# Patient Record
Sex: MALE | Race: WHITE | NOT HISPANIC OR LATINO
[De-identification: names, ages, dates, MRNs, and addresses within clinical notes are randomized per-mention and may not be internally consistent; named-entity substitution may affect disease eponyms.]

---

## 2017-07-25 VITALS
WEIGHT: 174.83 LBS | HEIGHT: 68.5 IN | SYSTOLIC BLOOD PRESSURE: 117 MMHG | OXYGEN SATURATION: 100 % | TEMPERATURE: 208 F | DIASTOLIC BLOOD PRESSURE: 78 MMHG | RESPIRATION RATE: 18 BRPM | HEART RATE: 79 BPM

## 2017-07-25 NOTE — PATIENT PROFILE ADULT. - PSH
History of surgery  R shoulder History of colonoscopy    History of endoscopy    History of surgery  R shoulder 2002  History of torn meniscus of left knee

## 2017-07-26 ENCOUNTER — TRANSCRIPTION ENCOUNTER (OUTPATIENT)
Age: 67
End: 2017-07-26

## 2017-07-26 ENCOUNTER — INPATIENT (INPATIENT)
Facility: HOSPITAL | Age: 67
LOS: 0 days | Discharge: HOME | DRG: 483 | End: 2017-07-27
Attending: ORTHOPAEDIC SURGERY | Admitting: ORTHOPAEDIC SURGERY
Payer: COMMERCIAL

## 2017-07-26 DIAGNOSIS — Z87.828 PERSONAL HISTORY OF OTHER (HEALED) PHYSICAL INJURY AND TRAUMA: Chronic | ICD-10-CM

## 2017-07-26 DIAGNOSIS — Z98.890 OTHER SPECIFIED POSTPROCEDURAL STATES: Chronic | ICD-10-CM

## 2017-07-26 DIAGNOSIS — M19.012 PRIMARY OSTEOARTHRITIS, LEFT SHOULDER: ICD-10-CM

## 2017-07-26 LAB
BASOPHILS NFR BLD AUTO: 0.2 % — SIGNIFICANT CHANGE UP (ref 0–2)
EOSINOPHIL NFR BLD AUTO: 0.9 % — SIGNIFICANT CHANGE UP (ref 0–6)
HCT VFR BLD CALC: 26.3 % — LOW (ref 39–50)
HGB BLD-MCNC: 8.4 G/DL — LOW (ref 13–17)
LYMPHOCYTES # BLD AUTO: 8.2 % — LOW (ref 13–44)
MCHC RBC-ENTMCNC: 29.5 PG — SIGNIFICANT CHANGE UP (ref 27–34)
MCHC RBC-ENTMCNC: 31.9 G/DL — LOW (ref 32–36)
MCV RBC AUTO: 92.3 FL — SIGNIFICANT CHANGE UP (ref 80–100)
MONOCYTES NFR BLD AUTO: 7.5 % — SIGNIFICANT CHANGE UP (ref 2–14)
NEUTROPHILS NFR BLD AUTO: 83.2 % — HIGH (ref 43–77)
PLATELET # BLD AUTO: 132 K/UL — LOW (ref 150–400)
RBC # BLD: 2.85 M/UL — LOW (ref 4.2–5.8)
RBC # FLD: 12.1 % — SIGNIFICANT CHANGE UP (ref 10.3–16.9)
WBC # BLD: 9.2 K/UL — SIGNIFICANT CHANGE UP (ref 3.8–10.5)
WBC # FLD AUTO: 9.2 K/UL — SIGNIFICANT CHANGE UP (ref 3.8–10.5)

## 2017-07-26 PROCEDURE — 73020 X-RAY EXAM OF SHOULDER: CPT | Mod: 26,LT

## 2017-07-26 PROCEDURE — 99222 1ST HOSP IP/OBS MODERATE 55: CPT | Mod: GC

## 2017-07-26 RX ORDER — SODIUM CHLORIDE 9 MG/ML
1000 INJECTION, SOLUTION INTRAVENOUS
Qty: 0 | Refills: 0 | Status: DISCONTINUED | OUTPATIENT
Start: 2017-07-26 | End: 2017-07-27

## 2017-07-26 RX ORDER — HYDROCHLOROTHIAZIDE 25 MG
12.5 TABLET ORAL DAILY
Qty: 0 | Refills: 0 | Status: DISCONTINUED | OUTPATIENT
Start: 2017-07-26 | End: 2017-07-27

## 2017-07-26 RX ORDER — SODIUM CHLORIDE 9 MG/ML
1000 INJECTION INTRAMUSCULAR; INTRAVENOUS; SUBCUTANEOUS ONCE
Qty: 0 | Refills: 0 | Status: COMPLETED | OUTPATIENT
Start: 2017-07-26 | End: 2017-07-26

## 2017-07-26 RX ORDER — OXYCODONE HYDROCHLORIDE 5 MG/1
10 TABLET ORAL EVERY 4 HOURS
Qty: 0 | Refills: 0 | Status: DISCONTINUED | OUTPATIENT
Start: 2017-07-27 | End: 2017-07-27

## 2017-07-26 RX ORDER — VALSARTAN 80 MG/1
160 TABLET ORAL DAILY
Qty: 0 | Refills: 0 | Status: DISCONTINUED | OUTPATIENT
Start: 2017-07-26 | End: 2017-07-27

## 2017-07-26 RX ORDER — FINASTERIDE 5 MG/1
5 TABLET, FILM COATED ORAL DAILY
Qty: 0 | Refills: 0 | Status: DISCONTINUED | OUTPATIENT
Start: 2017-07-26 | End: 2017-07-27

## 2017-07-26 RX ORDER — HYDROMORPHONE HYDROCHLORIDE 2 MG/ML
0.5 INJECTION INTRAMUSCULAR; INTRAVENOUS; SUBCUTANEOUS
Qty: 0 | Refills: 0 | Status: DISCONTINUED | OUTPATIENT
Start: 2017-07-26 | End: 2017-07-27

## 2017-07-26 RX ORDER — ONDANSETRON 8 MG/1
4 TABLET, FILM COATED ORAL EVERY 6 HOURS
Qty: 0 | Refills: 0 | Status: DISCONTINUED | OUTPATIENT
Start: 2017-07-26 | End: 2017-07-27

## 2017-07-26 RX ORDER — MORPHINE SULFATE 50 MG/1
4 CAPSULE, EXTENDED RELEASE ORAL
Qty: 0 | Refills: 0 | Status: DISCONTINUED | OUTPATIENT
Start: 2017-07-26 | End: 2017-07-27

## 2017-07-26 RX ORDER — CEFAZOLIN SODIUM 1 G
2000 VIAL (EA) INJECTION EVERY 8 HOURS
Qty: 0 | Refills: 0 | Status: COMPLETED | OUTPATIENT
Start: 2017-07-26 | End: 2017-07-27

## 2017-07-26 RX ADMIN — Medication 100 MILLIGRAM(S): at 22:13

## 2017-07-26 RX ADMIN — SODIUM CHLORIDE 1000 MILLILITER(S): 9 INJECTION INTRAMUSCULAR; INTRAVENOUS; SUBCUTANEOUS at 22:14

## 2017-07-26 NOTE — PROGRESS NOTE ADULT - SUBJECTIVE AND OBJECTIVE BOX
Ortho Post Op Check    Procedure: L TSA Bicep Tenodesis  Surgeon: Álvaro  Laterality: Left    Pt comfortable without complaints, pain controlled  Denies CP, SOB, N/V, numbness/tingling     Vital Signs Last 24 Hrs  T(C): 36 (07-26-17 @ 17:00), Max: 36 (07-26-17 @ 17:00)  T(F): 96.8 (07-26-17 @ 17:00), Max: 96.8 (07-26-17 @ 17:00)  HR: 74 (07-26-17 @ 17:30) (69 - 76)  BP: 97/57 (07-26-17 @ 17:30) (94/58 - 118/59)  BP(mean): --  RR: 16 (07-26-17 @ 17:30) (15 - 16)  SpO2: 98% (07-26-17 @ 17:30) (97% - 98%)  AVSS    General: Pt Alert and oriented, NAD  DSG C/D/I  Pulses:  Sensation: Can feel prerssure   Motor: 0/5 LUE Globally. (Patient has a scalene block, expected to wean off by tomorrow).                A/P: 66yMale POD#0 s/p L TSA Bicep tenodesis  - Stable  - Pain Control  - DVT ppx: ASA   - Post op abx: Ancef  - PT, WBS: NWB LUE    Ortho Pager [phone]

## 2017-07-26 NOTE — DISCHARGE NOTE ADULT - CARE PLAN
Principal Discharge DX:	Left shoulder pain  Goal:	improvement after surgery  Instructions for follow-up, activity and diet:	No strenuous activity, heavy lifting, driving, tub bathing, or returning to work until cleared by MD.  You may shower--dressing is waterproof.  Remove dressing after post op day 5, then leave incision open to air.  Follow up with Dr. Rea, call: office to schedule an appt within 10-14 days.  If you don't have a bowel movement by post op day 3, then take Milk of Magnesia (over the counter).  If no bowel movement by at least post op day 5, then use a Dulcolax suppository (over the counter) and/or a Fleets enema--if still no bowel movement, call your MD.  Contact your doctor if you experience: fever greater than 101.5, chills, chest pain, difficulty breathing, bleeding, redness or heat around the incision. Principal Discharge DX:	Left shoulder pain  Goal:	improvement after surgery  Instructions for follow-up, activity and diet:	Non-weight bearing on left arm.  Keep sling on at all times except when showering and dressing.  No strenuous activity, heavy lifting, driving, tub bathing, or returning to work until cleared by MD.  You may shower--dressing is waterproof.  Remove dressing after post op day 7, then leave incision open to air.  Follow up with Dr. Rea, call: office to schedule an appt within 10-14 days.  If you don't have a bowel movement by post op day 3, then take Milk of Magnesia (over the counter).  If no bowel movement by at least post op day 5, then use a Dulcolax suppository (over the counter) and/or a Fleets enema--if still no bowel movement, call your MD.  Contact your doctor if you experience: fever greater than 101.5, chills, chest pain, difficulty breathing, bleeding, redness or heat around the incision.

## 2017-07-26 NOTE — H&P ADULT - PSH
History of colonoscopy    History of endoscopy    History of surgery  R shoulder 2002  History of torn meniscus of left knee

## 2017-07-26 NOTE — DISCHARGE NOTE ADULT - MEDICATION SUMMARY - MEDICATIONS TO TAKE
I will START or STAY ON the medications listed below when I get home from the hospital:    Avodart 0.5 mg oral capsule  -- 1 cap(s) by mouth once a day  -- Indication: For Home medicine    meloxicam 15 mg oral tablet  -- 1 tab(s) by mouth once a day  -- Do not take this drug if you are pregnant.  Obtain medical advice before taking any non-prescription drugs as some may affect the action of this medication.  Take with food or milk.    -- Indication: For antiinflammatory/pain control    acetaminophen-oxycodone 325 mg-5 mg oral tablet  -- 1 to 2 tab(s) by mouth every 4 to 6 hours, as needed, pain MDD:6  -- Caution federal law prohibits the transfer of this drug to any person other  than the person for whom it was prescribed.  May cause drowsiness.  Alcohol may intensify this effect.  Use care when operating dangerous machinery.  This prescription cannot be refilled.  This product contains acetaminophen.  Do not use  with any other product containing acetaminophen to prevent possible liver damage.  Using more of this medication than prescribed may cause serious breathing problems.    -- Indication: For moderate to severe pain    aspirin 325 mg oral delayed release tablet  -- 1 tab(s) by mouth 2 times a day  Take for 4 weeks after surgery to prevent blood clots.  OVER THE COUNTER MEDICINE  -- Indication: For Prevent blood clots    Diovan  mg-12.5 mg oral tablet  --  by mouth 2 times a day  -- Indication: For Hypertension    bisacodyl 10 mg rectal suppository  -- 1 suppository(ies) rectally once a day, As needed, If no bowel movement by postoperative day #2  -- Indication: For constipation    docusate sodium 100 mg oral capsule  -- 1 cap(s) by mouth 3 times a day  -- Indication: For constipation    polyethylene glycol 3350 oral powder for reconstitution  -- 17 gram(s) by mouth once a day  -- Indication: For constipation    senna oral tablet  -- 2 tab(s) by mouth once a day (at bedtime), As needed, Constipation  -- Indication: For constipation

## 2017-07-26 NOTE — H&P ADULT - HISTORY OF PRESENT ILLNESS
66M RHD c/o Left shoulder pain worsened over time without improvement. Denies numbness, tingling. Ambulates without assist. Presents today for elective Left TSR.

## 2017-07-26 NOTE — DISCHARGE NOTE ADULT - PLAN OF CARE
improvement after surgery No strenuous activity, heavy lifting, driving, tub bathing, or returning to work until cleared by MD.  You may shower--dressing is waterproof.  Remove dressing after post op day 5, then leave incision open to air.  Follow up with Dr. Rea, call: office to schedule an appt within 10-14 days.  If you don't have a bowel movement by post op day 3, then take Milk of Magnesia (over the counter).  If no bowel movement by at least post op day 5, then use a Dulcolax suppository (over the counter) and/or a Fleets enema--if still no bowel movement, call your MD.  Contact your doctor if you experience: fever greater than 101.5, chills, chest pain, difficulty breathing, bleeding, redness or heat around the incision. Non-weight bearing on left arm.  Keep sling on at all times except when showering and dressing.  No strenuous activity, heavy lifting, driving, tub bathing, or returning to work until cleared by MD.  You may shower--dressing is waterproof.  Remove dressing after post op day 7, then leave incision open to air.  Follow up with Dr. Rea, call: office to schedule an appt within 10-14 days.  If you don't have a bowel movement by post op day 3, then take Milk of Magnesia (over the counter).  If no bowel movement by at least post op day 5, then use a Dulcolax suppository (over the counter) and/or a Fleets enema--if still no bowel movement, call your MD.  Contact your doctor if you experience: fever greater than 101.5, chills, chest pain, difficulty breathing, bleeding, redness or heat around the incision.

## 2017-07-26 NOTE — DISCHARGE NOTE ADULT - PATIENT PORTAL LINK FT
“You can access the [employer] Patient Portal, offered by [facility], by registering with the following website: [email or web address]”

## 2017-07-26 NOTE — DISCHARGE NOTE ADULT - CARE PROVIDER_API CALL
Boyd Rea), Orthopaedic Surgery  [address] [place] [address]  Phone: [phone]  Fax: [phone]

## 2017-07-26 NOTE — DISCHARGE NOTE ADULT - NS AS ACTIVITY OBS
No Heavy lifting/straining Do not drive or operate machinery/Showering allowed/No Heavy lifting/straining/Do not make important decisions

## 2017-07-26 NOTE — DISCHARGE NOTE ADULT - HOSPITAL COURSE
Admit  OR  Periop Antibx  DVT ppx  PT   Pain mgt Admit 7/26/17  OR 7/26/17- L TSA  Periop Antibx  DVT ppx  PT   Pain mgt

## 2017-07-26 NOTE — H&P ADULT - NSHPPHYSICALEXAM_GEN_ALL_CORE
Left upper ext: decreased ROM secondary to pain, sensation intact, pulses intact,  5/5  Rest of PE per MD clearance

## 2017-07-27 VITALS
RESPIRATION RATE: 18 BRPM | SYSTOLIC BLOOD PRESSURE: 127 MMHG | DIASTOLIC BLOOD PRESSURE: 80 MMHG | OXYGEN SATURATION: 100 % | HEART RATE: 107 BPM | TEMPERATURE: 97 F

## 2017-07-27 LAB
ANION GAP SERPL CALC-SCNC: 12 MMOL/L — SIGNIFICANT CHANGE UP (ref 5–17)
BUN SERPL-MCNC: 27 MG/DL — HIGH (ref 7–23)
CALCIUM SERPL-MCNC: 9.1 MG/DL — SIGNIFICANT CHANGE UP (ref 8.4–10.5)
CHLORIDE SERPL-SCNC: 106 MMOL/L — SIGNIFICANT CHANGE UP (ref 96–108)
CO2 SERPL-SCNC: 22 MMOL/L — SIGNIFICANT CHANGE UP (ref 22–31)
CREAT SERPL-MCNC: 1.2 MG/DL — SIGNIFICANT CHANGE UP (ref 0.5–1.3)
GLUCOSE SERPL-MCNC: 135 MG/DL — HIGH (ref 70–99)
HCT VFR BLD CALC: 31.2 % — LOW (ref 39–50)
HGB BLD-MCNC: 10.7 G/DL — LOW (ref 13–17)
MCHC RBC-ENTMCNC: 30.1 PG — SIGNIFICANT CHANGE UP (ref 27–34)
MCHC RBC-ENTMCNC: 34.3 G/DL — SIGNIFICANT CHANGE UP (ref 32–36)
MCV RBC AUTO: 87.6 FL — SIGNIFICANT CHANGE UP (ref 80–100)
MRSA PCR RESULT.: NEGATIVE — SIGNIFICANT CHANGE UP
PLATELET # BLD AUTO: 172 K/UL — SIGNIFICANT CHANGE UP (ref 150–400)
POTASSIUM SERPL-MCNC: 4.3 MMOL/L — SIGNIFICANT CHANGE UP (ref 3.5–5.3)
POTASSIUM SERPL-SCNC: 4.3 MMOL/L — SIGNIFICANT CHANGE UP (ref 3.5–5.3)
RBC # BLD: 3.56 M/UL — LOW (ref 4.2–5.8)
RBC # FLD: 12.9 % — SIGNIFICANT CHANGE UP (ref 10.3–16.9)
S AUREUS DNA NOSE QL NAA+PROBE: NEGATIVE — SIGNIFICANT CHANGE UP
SODIUM SERPL-SCNC: 140 MMOL/L — SIGNIFICANT CHANGE UP (ref 135–145)
WBC # BLD: 12.6 K/UL — HIGH (ref 3.8–10.5)
WBC # FLD AUTO: 12.6 K/UL — HIGH (ref 3.8–10.5)

## 2017-07-27 RX ORDER — DOCUSATE SODIUM 100 MG
1 CAPSULE ORAL
Qty: 0 | Refills: 0 | DISCHARGE
Start: 2017-07-27

## 2017-07-27 RX ORDER — DOCUSATE SODIUM 100 MG
100 CAPSULE ORAL THREE TIMES A DAY
Qty: 0 | Refills: 0 | Status: DISCONTINUED | OUTPATIENT
Start: 2017-07-27 | End: 2017-07-27

## 2017-07-27 RX ORDER — HYDROMORPHONE HYDROCHLORIDE 2 MG/ML
0.5 INJECTION INTRAMUSCULAR; INTRAVENOUS; SUBCUTANEOUS
Qty: 0 | Refills: 0 | Status: DISCONTINUED | OUTPATIENT
Start: 2017-07-27 | End: 2017-07-27

## 2017-07-27 RX ORDER — SENNA PLUS 8.6 MG/1
2 TABLET ORAL AT BEDTIME
Qty: 0 | Refills: 0 | Status: DISCONTINUED | OUTPATIENT
Start: 2017-07-27 | End: 2017-07-27

## 2017-07-27 RX ORDER — ASPIRIN/CALCIUM CARB/MAGNESIUM 324 MG
325 TABLET ORAL
Qty: 0 | Refills: 0 | Status: DISCONTINUED | OUTPATIENT
Start: 2017-07-27 | End: 2017-07-27

## 2017-07-27 RX ORDER — MAGNESIUM HYDROXIDE 400 MG/1
30 TABLET, CHEWABLE ORAL DAILY
Qty: 0 | Refills: 0 | Status: DISCONTINUED | OUTPATIENT
Start: 2017-07-27 | End: 2017-07-27

## 2017-07-27 RX ORDER — OXYCODONE HYDROCHLORIDE 5 MG/1
5 TABLET ORAL EVERY 4 HOURS
Qty: 0 | Refills: 0 | Status: DISCONTINUED | OUTPATIENT
Start: 2017-07-27 | End: 2017-07-27

## 2017-07-27 RX ORDER — SENNA PLUS 8.6 MG/1
2 TABLET ORAL
Qty: 0 | Refills: 0 | DISCHARGE
Start: 2017-07-27

## 2017-07-27 RX ORDER — OXYCODONE HYDROCHLORIDE 5 MG/1
1 TABLET ORAL
Qty: 42 | Refills: 0
Start: 2017-07-27

## 2017-07-27 RX ORDER — KETOROLAC TROMETHAMINE 30 MG/ML
15 SYRINGE (ML) INJECTION EVERY 6 HOURS
Qty: 0 | Refills: 0 | Status: DISCONTINUED | OUTPATIENT
Start: 2017-07-27 | End: 2017-07-27

## 2017-07-27 RX ORDER — POLYETHYLENE GLYCOL 3350 17 G/17G
17 POWDER, FOR SOLUTION ORAL DAILY
Qty: 0 | Refills: 0 | Status: DISCONTINUED | OUTPATIENT
Start: 2017-07-27 | End: 2017-07-27

## 2017-07-27 RX ORDER — ACETAMINOPHEN 500 MG
650 TABLET ORAL EVERY 6 HOURS
Qty: 0 | Refills: 0 | Status: DISCONTINUED | OUTPATIENT
Start: 2017-07-27 | End: 2017-07-27

## 2017-07-27 RX ORDER — MELOXICAM 15 MG/1
1 TABLET ORAL
Qty: 30 | Refills: 0
Start: 2017-07-27 | End: 2017-08-26

## 2017-07-27 RX ORDER — ASPIRIN/CALCIUM CARB/MAGNESIUM 324 MG
1 TABLET ORAL
Qty: 0 | Refills: 0 | DISCHARGE
Start: 2017-07-27

## 2017-07-27 RX ORDER — POLYETHYLENE GLYCOL 3350 17 G/17G
17 POWDER, FOR SOLUTION ORAL
Qty: 0 | Refills: 0 | DISCHARGE
Start: 2017-07-27

## 2017-07-27 RX ADMIN — OXYCODONE HYDROCHLORIDE 10 MILLIGRAM(S): 5 TABLET ORAL at 12:20

## 2017-07-27 RX ADMIN — Medication 100 MILLIGRAM(S): at 05:31

## 2017-07-27 NOTE — PROGRESS NOTE ADULT - ASSESSMENT
A/P:  66y Male s/p L TSA on 7/26/17   - NWB LUE  - DVT ppx  - PT  - Analgesia  - Dispo  - Antibiotics: perioperative x24h

## 2017-07-27 NOTE — PROGRESS NOTE ADULT - SUBJECTIVE AND OBJECTIVE BOX
Orthopaedic Surgery Progress Note    S: Pain well-controlled. Denies CP/SOB/N/V    O:  Vital Signs Last 24 Hrs  T(C): 36.1 (27 Jul 2017 05:27), Max: 36.6 (26 Jul 2017 18:15)  T(F): 96.9 (27 Jul 2017 05:27), Max: 97.9 (26 Jul 2017 18:15)  HR: 78 (27 Jul 2017 05:27) (67 - 78)  BP: 102/59 (27 Jul 2017 05:27) (85/51 - 136/77)  BP(mean): --  RR: 15 (27 Jul 2017 05:27) (14 - 16)  SpO2: 96% (27 Jul 2017 05:27) (96% - 100%)    I&O's Summary    26 Jul 2017 07:01  -  27 Jul 2017 07:00  --------------------------------------------------------  IN: 2787.5 mL / OUT: 400 mL / NET: 2387.5 mL        MEDICATIONS  (STANDING):  lactated ringers. 1000 milliLiter(s) (125 mL/Hr) IV Continuous <Continuous>  finasteride 5 milliGRAM(s) Oral daily  valsartan 160 milliGRAM(s) Oral daily  hydrochlorothiazide 12.5 milliGRAM(s) Oral daily    MEDICATIONS  (PRN):  morphine  - Injectable 4 milliGRAM(s) IV Push every 3 hours PRN Severe Pain  ondansetron Injectable 4 milliGRAM(s) IV Push every 6 hours PRN Nausea and/or Vomiting  HYDROmorphone  Injectable 0.5 milliGRAM(s) IV Push every 10 minutes PRN breakthrough pain                            8.4    9.2   )-----------( 132      ( 26 Jul 2017 17:37 )             26.3                   EXAM:  Gen: NAD, Alert    LUE: Dressing C/D/I, Fires FPL/EIP/GARTH, SILT m/u/r, fingers wwp

## 2017-07-27 NOTE — OCCUPATIONAL THERAPY INITIAL EVALUATION ADULT - RANGE OF MOTION EXAMINATION, UPPER EXTREMITY
Right UE Active ROM was WNL (within normal limits)/Right UE Passive ROM was WNL (within normal limits)/left upper extremity NT except digits/wrist AROM WFL

## 2017-07-27 NOTE — OCCUPATIONAL THERAPY INITIAL EVALUATION ADULT - PERTINENT HX OF CURRENT PROBLEM, REHAB EVAL
66M RHD c/o Left shoulder pain worsened over time without improvement. Denies numbness, tingling. Ambulates without assist. Presents today for elective Left TSR. L TSA on 7/26/17.

## 2017-07-27 NOTE — OCCUPATIONAL THERAPY INITIAL EVALUATION ADULT - MANUAL MUSCLE TESTING RESULTS, REHAB EVAL
left upper extremity NWB; right upper extremity >3/5 all joints, left hand  4/5/grossly assessed due to

## 2017-07-27 NOTE — OCCUPATIONAL THERAPY INITIAL EVALUATION ADULT - GENERAL OBSERVATIONS, REHAB EVAL
Right hand dominant. Patient cleared for Occupational Therapy by LUIS MANUEL Anand. Patient received supine in non-acute distress, +IV heplock, + left shoulder dressing C/D/I, + left upper extremity sling. Patient reports minimal discomfort left shoulder, not rated.

## 2017-07-31 DIAGNOSIS — K59.00 CONSTIPATION, UNSPECIFIED: ICD-10-CM

## 2017-07-31 DIAGNOSIS — M19.012 PRIMARY OSTEOARTHRITIS, LEFT SHOULDER: ICD-10-CM

## 2017-07-31 DIAGNOSIS — I10 ESSENTIAL (PRIMARY) HYPERTENSION: ICD-10-CM

## 2017-07-31 DIAGNOSIS — N40.0 BENIGN PROSTATIC HYPERPLASIA WITHOUT LOWER URINARY TRACT SYMPTOMS: ICD-10-CM

## 2017-07-31 DIAGNOSIS — Z87.11 PERSONAL HISTORY OF PEPTIC ULCER DISEASE: ICD-10-CM

## 2017-07-31 LAB — SURGICAL PATHOLOGY STUDY: SIGNIFICANT CHANGE UP

## 2017-08-10 PROCEDURE — C1776: CPT

## 2017-08-10 PROCEDURE — 87641 MR-STAPH DNA AMP PROBE: CPT

## 2017-08-10 PROCEDURE — 80048 BASIC METABOLIC PNL TOTAL CA: CPT

## 2017-08-10 PROCEDURE — 73020 X-RAY EXAM OF SHOULDER: CPT

## 2017-08-10 PROCEDURE — 86901 BLOOD TYPING SEROLOGIC RH(D): CPT

## 2017-08-10 PROCEDURE — 97161 PT EVAL LOW COMPLEX 20 MIN: CPT

## 2017-08-10 PROCEDURE — 85027 COMPLETE CBC AUTOMATED: CPT

## 2017-08-10 PROCEDURE — 85025 COMPLETE CBC W/AUTO DIFF WBC: CPT

## 2017-08-10 PROCEDURE — 88300 SURGICAL PATH GROSS: CPT

## 2017-08-10 PROCEDURE — C1713: CPT

## 2017-08-10 PROCEDURE — 86900 BLOOD TYPING SEROLOGIC ABO: CPT

## 2017-08-10 PROCEDURE — 36415 COLL VENOUS BLD VENIPUNCTURE: CPT

## 2020-03-04 PROBLEM — I10 ESSENTIAL (PRIMARY) HYPERTENSION: Chronic | Status: ACTIVE | Noted: 2017-07-25

## 2020-03-04 PROBLEM — K25.9 GASTRIC ULCER, UNSPECIFIED AS ACUTE OR CHRONIC, WITHOUT HEMORRHAGE OR PERFORATION: Chronic | Status: ACTIVE | Noted: 2017-07-25

## 2020-03-04 PROBLEM — M25.512 PAIN IN LEFT SHOULDER: Chronic | Status: ACTIVE | Noted: 2017-07-25

## 2020-06-09 VITALS
RESPIRATION RATE: 16 BRPM | OXYGEN SATURATION: 100 % | SYSTOLIC BLOOD PRESSURE: 111 MMHG | WEIGHT: 199.3 LBS | HEART RATE: 78 BPM | DIASTOLIC BLOOD PRESSURE: 74 MMHG | TEMPERATURE: 97 F | HEIGHT: 69 IN

## 2020-06-09 RX ORDER — POVIDONE-IODINE 5 %
1 AEROSOL (ML) TOPICAL ONCE
Refills: 0 | Status: DISCONTINUED | OUTPATIENT
Start: 2020-06-10 | End: 2020-06-12

## 2020-06-09 NOTE — H&P ADULT - NSHPLABSRESULTS_GEN_ALL_CORE
Preop CBC, BMP, PT/PTT/INR, UA - WNL per medical clearance  Negative covid test 6/8/20  Nasal swab negative  Preop CXR - WNL per medical clearance   Preop EKG - sinus rhythm - WNL per medical clearance

## 2020-06-09 NOTE — H&P ADULT - NSHPPHYSICALEXAM_GEN_ALL_CORE
MSK: + decreased ROM 2/2 pain, left knee    Remainder of exam per medical clearance note NAD, sitting comfortably in chair.  MSK: Decreased ROM of left knee secondary to pain, sensation intact to light touch in bilateral lower extremities, DP 2+ bilaterally, EHL/FHL/TA/GS 5/5    Rest of PE per medical clearance

## 2020-06-09 NOTE — H&P ADULT - NSICDXPASTSURGICALHX_GEN_ALL_CORE_FT
PAST SURGICAL HISTORY:  History of colonoscopy     History of endoscopy     History of surgery R shoulder 2002    History of torn meniscus of left knee

## 2020-06-09 NOTE — H&P ADULT - HISTORY OF PRESENT ILLNESS
69M c/o left knee pain x     Present for elective left total knee replacement 69M c/o left knee pain x 1-2 years worsened over time without improvement. Failed conservative treatments. Denies numbness, tingling. Ambulates without assist. Pt denies any recent fevers/chills, chest pain, body aches, shortness of breath, sick contacts. Denies history of DVT/PE. Presents today for elective left total knee replacement.

## 2020-06-09 NOTE — H&P ADULT - PROBLEM SELECTOR PLAN 1
Admit to Orthopaedic Service.  Presents today for elective left TKR   Pt medically stable and cleared for procedure today by Dr. Mcgarry

## 2020-06-10 ENCOUNTER — INPATIENT (INPATIENT)
Facility: HOSPITAL | Age: 70
LOS: 1 days | Discharge: HOME HEALTH | DRG: 470 | End: 2020-06-12
Attending: ORTHOPAEDIC SURGERY | Admitting: ORTHOPAEDIC SURGERY
Payer: MEDICARE

## 2020-06-10 DIAGNOSIS — Z98.890 OTHER SPECIFIED POSTPROCEDURAL STATES: Chronic | ICD-10-CM

## 2020-06-10 DIAGNOSIS — I10 ESSENTIAL (PRIMARY) HYPERTENSION: ICD-10-CM

## 2020-06-10 DIAGNOSIS — Z87.828 PERSONAL HISTORY OF OTHER (HEALED) PHYSICAL INJURY AND TRAUMA: Chronic | ICD-10-CM

## 2020-06-10 DIAGNOSIS — Z98.890 OTHER SPECIFIED POSTPROCEDURAL STATES: ICD-10-CM

## 2020-06-10 DIAGNOSIS — M17.12 UNILATERAL PRIMARY OSTEOARTHRITIS, LEFT KNEE: ICD-10-CM

## 2020-06-10 DIAGNOSIS — M19.90 UNSPECIFIED OSTEOARTHRITIS, UNSPECIFIED SITE: ICD-10-CM

## 2020-06-10 DIAGNOSIS — K25.9 GASTRIC ULCER, UNSPECIFIED AS ACUTE OR CHRONIC, WITHOUT HEMORRHAGE OR PERFORATION: ICD-10-CM

## 2020-06-10 LAB — GLUCOSE BLDC GLUCOMTR-MCNC: 218 MG/DL — HIGH (ref 70–99)

## 2020-06-10 PROCEDURE — 73560 X-RAY EXAM OF KNEE 1 OR 2: CPT | Mod: 26,LT

## 2020-06-10 PROCEDURE — 99233 SBSQ HOSP IP/OBS HIGH 50: CPT | Mod: GC

## 2020-06-10 RX ORDER — OXYCODONE HYDROCHLORIDE 5 MG/1
5 TABLET ORAL EVERY 4 HOURS
Refills: 0 | Status: DISCONTINUED | OUTPATIENT
Start: 2020-06-10 | End: 2020-06-12

## 2020-06-10 RX ORDER — DUTASTERIDE 0.5 MG/1
1 CAPSULE, LIQUID FILLED ORAL
Qty: 0 | Refills: 0 | DISCHARGE

## 2020-06-10 RX ORDER — MORPHINE SULFATE 50 MG/1
4 CAPSULE, EXTENDED RELEASE ORAL EVERY 4 HOURS
Refills: 0 | Status: DISCONTINUED | OUTPATIENT
Start: 2020-06-10 | End: 2020-06-12

## 2020-06-10 RX ORDER — CEFAZOLIN SODIUM 1 G
2000 VIAL (EA) INJECTION EVERY 8 HOURS
Refills: 0 | Status: COMPLETED | OUTPATIENT
Start: 2020-06-10 | End: 2020-06-10

## 2020-06-10 RX ORDER — ACETAMINOPHEN 500 MG
975 TABLET ORAL EVERY 8 HOURS
Refills: 0 | Status: DISCONTINUED | OUTPATIENT
Start: 2020-06-10 | End: 2020-06-12

## 2020-06-10 RX ORDER — PANTOPRAZOLE SODIUM 20 MG/1
40 TABLET, DELAYED RELEASE ORAL
Refills: 0 | Status: DISCONTINUED | OUTPATIENT
Start: 2020-06-10 | End: 2020-06-12

## 2020-06-10 RX ORDER — ONDANSETRON 8 MG/1
4 TABLET, FILM COATED ORAL EVERY 4 HOURS
Refills: 0 | Status: DISCONTINUED | OUTPATIENT
Start: 2020-06-10 | End: 2020-06-12

## 2020-06-10 RX ORDER — ALPRAZOLAM 0.25 MG
1 TABLET ORAL AT BEDTIME
Refills: 0 | Status: DISCONTINUED | OUTPATIENT
Start: 2020-06-10 | End: 2020-06-12

## 2020-06-10 RX ORDER — ASPIRIN/CALCIUM CARB/MAGNESIUM 324 MG
325 TABLET ORAL
Refills: 0 | Status: DISCONTINUED | OUTPATIENT
Start: 2020-06-10 | End: 2020-06-12

## 2020-06-10 RX ORDER — ALPRAZOLAM 0.25 MG
1 TABLET ORAL
Qty: 0 | Refills: 0 | DISCHARGE

## 2020-06-10 RX ORDER — FAMOTIDINE 10 MG/ML
1 INJECTION INTRAVENOUS
Qty: 0 | Refills: 0 | DISCHARGE

## 2020-06-10 RX ORDER — LOSARTAN POTASSIUM 100 MG/1
100 TABLET, FILM COATED ORAL DAILY
Refills: 0 | Status: DISCONTINUED | OUTPATIENT
Start: 2020-06-10 | End: 2020-06-10

## 2020-06-10 RX ORDER — VALSARTAN 80 MG/1
160 TABLET ORAL DAILY
Refills: 0 | Status: DISCONTINUED | OUTPATIENT
Start: 2020-06-11 | End: 2020-06-12

## 2020-06-10 RX ORDER — KETOROLAC TROMETHAMINE 30 MG/ML
15 SYRINGE (ML) INJECTION EVERY 6 HOURS
Refills: 0 | Status: DISCONTINUED | OUTPATIENT
Start: 2020-06-10 | End: 2020-06-11

## 2020-06-10 RX ORDER — SODIUM CHLORIDE 9 MG/ML
1000 INJECTION, SOLUTION INTRAVENOUS
Refills: 0 | Status: DISCONTINUED | OUTPATIENT
Start: 2020-06-10 | End: 2020-06-12

## 2020-06-10 RX ORDER — MAGNESIUM HYDROXIDE 400 MG/1
30 TABLET, CHEWABLE ORAL DAILY
Refills: 0 | Status: DISCONTINUED | OUTPATIENT
Start: 2020-06-10 | End: 2020-06-12

## 2020-06-10 RX ORDER — DEXLANSOPRAZOLE 30 MG/1
1 CAPSULE, DELAYED RELEASE ORAL
Qty: 0 | Refills: 0 | DISCHARGE

## 2020-06-10 RX ORDER — CELECOXIB 200 MG/1
200 CAPSULE ORAL
Refills: 0 | Status: DISCONTINUED | OUTPATIENT
Start: 2020-06-12 | End: 2020-06-12

## 2020-06-10 RX ORDER — CHLORHEXIDINE GLUCONATE 213 G/1000ML
1 SOLUTION TOPICAL ONCE
Refills: 0 | Status: DISCONTINUED | OUTPATIENT
Start: 2020-06-10 | End: 2020-06-10

## 2020-06-10 RX ORDER — OXYCODONE HYDROCHLORIDE 5 MG/1
10 TABLET ORAL EVERY 4 HOURS
Refills: 0 | Status: DISCONTINUED | OUTPATIENT
Start: 2020-06-10 | End: 2020-06-12

## 2020-06-10 RX ORDER — FAMOTIDINE 10 MG/ML
40 INJECTION INTRAVENOUS AT BEDTIME
Refills: 0 | Status: DISCONTINUED | OUTPATIENT
Start: 2020-06-10 | End: 2020-06-12

## 2020-06-10 RX ORDER — HYDROCHLOROTHIAZIDE 25 MG
12.5 TABLET ORAL DAILY
Refills: 0 | Status: DISCONTINUED | OUTPATIENT
Start: 2020-06-10 | End: 2020-06-11

## 2020-06-10 RX ORDER — FINASTERIDE 5 MG/1
5 TABLET, FILM COATED ORAL DAILY
Refills: 0 | Status: DISCONTINUED | OUTPATIENT
Start: 2020-06-10 | End: 2020-06-12

## 2020-06-10 RX ORDER — MORPHINE SULFATE 50 MG/1
4 CAPSULE, EXTENDED RELEASE ORAL
Refills: 0 | Status: DISCONTINUED | OUTPATIENT
Start: 2020-06-10 | End: 2020-06-10

## 2020-06-10 RX ADMIN — PANTOPRAZOLE SODIUM 40 MILLIGRAM(S): 20 TABLET, DELAYED RELEASE ORAL at 14:41

## 2020-06-10 RX ADMIN — FAMOTIDINE 40 MILLIGRAM(S): 10 INJECTION INTRAVENOUS at 22:10

## 2020-06-10 RX ADMIN — Medication 975 MILLIGRAM(S): at 22:11

## 2020-06-10 RX ADMIN — Medication 15 MILLIGRAM(S): at 13:10

## 2020-06-10 RX ADMIN — Medication 100 MILLIGRAM(S): at 16:01

## 2020-06-10 RX ADMIN — Medication 30 MILLILITER(S): at 18:14

## 2020-06-10 RX ADMIN — FINASTERIDE 5 MILLIGRAM(S): 5 TABLET, FILM COATED ORAL at 14:41

## 2020-06-10 RX ADMIN — LOSARTAN POTASSIUM 100 MILLIGRAM(S): 100 TABLET, FILM COATED ORAL at 14:41

## 2020-06-10 RX ADMIN — Medication 975 MILLIGRAM(S): at 14:41

## 2020-06-10 RX ADMIN — Medication 325 MILLIGRAM(S): at 18:14

## 2020-06-10 RX ADMIN — Medication 100 MILLIGRAM(S): at 22:12

## 2020-06-10 RX ADMIN — Medication 15 MILLIGRAM(S): at 18:14

## 2020-06-10 RX ADMIN — SODIUM CHLORIDE 120 MILLILITER(S): 9 INJECTION, SOLUTION INTRAVENOUS at 22:17

## 2020-06-10 NOTE — PROGRESS NOTE ADULT - SUBJECTIVE AND OBJECTIVE BOX
Interval Events: Reviewed  Patient seen and examined at bedside.    Patient is a 69y old  Male who presents with a chief complaint of Left knee pain (09 Jun 2020 13:58)    he is doing well and the pain is controlled  PAST MEDICAL & SURGICAL HISTORY:  Gastric ulcer  Hypertension  Left shoulder pain  History of colonoscopy  History of endoscopy  History of torn meniscus of left knee  History of surgery: R shoulder 2002      MEDICATIONS:  Pulmonary:    Antimicrobials:  ceFAZolin   IVPB 2000 milliGRAM(s) IV Intermittent every 8 hours    Anticoagulants:  aspirin enteric coated 325 milliGRAM(s) Oral two times a day    Cardiac:  hydrochlorothiazide 12.5 milliGRAM(s) Oral daily  losartan 100 milliGRAM(s) Oral daily      Allergies    No Known Allergies    Intolerances        Vital Signs Last 24 Hrs  T(C): 36.1 (10 Mitch 2020 09:17), Max: 36.1 (10 Mitch 2020 09:17)  T(F): 97 (10 Mitch 2020 09:17), Max: 97 (10 Mitch 2020 09:17)  HR: 93 (10 Mitch 2020 10:57) (79 - 93)  BP: 115/59 (10 Mitch 2020 10:57) (95/53 - 120/61)  BP(mean): 84 (10 Mitch 2020 10:57) (70 - 84)  RR: 18 (10 Mitch 2020 10:57) (14 - 26)  SpO2: 96% (10 Mitch 2020 10:57) (94% - 99%)        Review of Systems:   •	General: negative  •	Skin/Breast: negative  •	Ophthalmologic: negative  •	ENMT: negative  •	Respiratory and Thorax: negative  •	Cardiovascular: negative  •	Gastrointestinal: negative  •	Genitourinary: negative  •	Musculoskeletal: negative  •	Neurological: negative  •	Psychiatric: negative  •	Hematology/Lymphatics: negative  •	Endocrine: negative  •	Allergic/Immunologic: negative    Physical Exam:   • Constitutional:	Well-developed, well nourished  • Eyes:	EOMI; PERRL; no drainage or redness  • ENMT:	No oral lesions; no gross abnormalities  • Neck	No bruits; no thyromegaly or nodules  • Breasts:	not examined  • Back:	No deformity or limitation of movement  • Respiratory:	Breath Sounds equal & clear to percussion & auscultation, no accessory muscle use  • Cardiovascular:	Regular rate & rhythm, normal S1, S2; no murmurs, gallops or rubs; no S3, S4  • Gastrointestinal:	Soft, non-tender, no hepatosplenomegaly, normal bowel sounds  • Genitourinary:	not examined  • Rectal: not examined  • Extremities:	No cyanosis, clubbing or edema  • Vascular:	Equal and normal pulses (carotid, femoral, dorsalis pedis)  • Neurologica:l	not examined  • Skin:	No lesions; no rash  • Lymph Nodes:	No lymphadedenopathy  • Musculoskeletal:	No joint pain, swelling or deformity; no limitation of movement        LABS:                                  RADIOLOGY & ADDITIONAL STUDIES (The following images were personally reviewed):  Humphries:                                     No  Urine output:                       adequate  DVT prophylaxis:                 Yes  Flattus:                                  Yes  Bowel movement:              No

## 2020-06-10 NOTE — PHYSICAL THERAPY INITIAL EVALUATION ADULT - ADDITIONAL COMMENTS
Pt lives in a house with 2 JAMAAL with his wife and son. At baseline, ambulates independently with no DME. 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

## 2020-06-10 NOTE — PROGRESS NOTE ADULT - PROBLEM SELECTOR PLAN 2
The patient is stable post operative total knee replacement on the left. Patient is on DVT prophylaxis

## 2020-06-10 NOTE — PROGRESS NOTE ADULT - SUBJECTIVE AND OBJECTIVE BOX
Orthopedics Post Op Check    Procedure: left TKA   Surgeon: Dr Rea    Pt comfortable, without complaints. Pain is well controlled, described as light. Pt walked with PT in the hallway already and is eating lunch comfortably. States he had his shoulders replaced so he knows what to expect with recovery.   Denies CP, SOB, N/V, numbness/tingling     Vital Signs Last 24 Hrs  T(C): 36.5 (10 Mitch 2020 13:48), Max: 36.5 (10 Mitch 2020 13:48)  T(F): 97.7 (10 Mitch 2020 13:48), Max: 97.7 (10 Mitch 2020 13:48)  HR: 108 (10 Mitch 2020 13:48) (79 - 108)  BP: 124/77 (10 Mitch 2020 13:48) (95/53 - 124/77)  BP(mean): 84 (10 Mitch 2020 10:57) (70 - 84)  RR: 17 (10 Mitch 2020 13:48) (14 - 26)  SpO2: 95% (10 Mitch 2020 13:48) (94% - 99%)  AVSS, NAD      General: Pt Alert and oriented, comfortable  Dressing C/D/I, cryo cuff in place  Sensation intact and equal BLE   Motor ehl/ta/gs/fhl 5/5 BLE  Pulses dp palpable, skin warm and well perfused.          Post op XR: hardware in place, s/p left TKA     A/P: 69yMale POD#0 s/p left TKA   - Stable  - Pain Control  - DVT ppx: ASA   - Huyen-op abx: Ancef  - PT, WBS: WBAT  - F/U AM Labs

## 2020-06-10 NOTE — PHYSICAL THERAPY INITIAL EVALUATION ADULT - PERTINENT HX OF CURRENT PROBLEM, REHAB EVAL
69M c/o left knee pain x 1-2 years worsened over time without improvement. Failed conservative treatments. Denies numbness, tingling. Ambulates without assist. S/p TKA.

## 2020-06-10 NOTE — PHYSICAL THERAPY INITIAL EVALUATION ADULT - ACTIVE RANGE OF MOTION EXAMINATION, REHAB EVAL
LLE AROM WFL with exception of knee flex 3-80 degrees/bilateral upper extremity Active ROM was WFL (within functional limits)/Right LE Active ROM was WFL (within functional limits)

## 2020-06-10 NOTE — PROGRESS NOTE ADULT - PROBLEM SELECTOR PLAN 1
As controlled. Continue losartan hydrochlorothiazide. Evaluate for discontinuing diuretic postoperatively of the blood pressure is a low normal side

## 2020-06-11 ENCOUNTER — TRANSCRIPTION ENCOUNTER (OUTPATIENT)
Age: 70
End: 2020-06-11

## 2020-06-11 DIAGNOSIS — R74.8 ABNORMAL LEVELS OF OTHER SERUM ENZYMES: ICD-10-CM

## 2020-06-11 LAB
ANION GAP SERPL CALC-SCNC: 11 MMOL/L — SIGNIFICANT CHANGE UP (ref 5–17)
ANION GAP SERPL CALC-SCNC: 15 MMOL/L — SIGNIFICANT CHANGE UP (ref 5–17)
BUN SERPL-MCNC: 35 MG/DL — HIGH (ref 7–23)
BUN SERPL-MCNC: 36 MG/DL — HIGH (ref 7–23)
CALCIUM SERPL-MCNC: 8.7 MG/DL — SIGNIFICANT CHANGE UP (ref 8.4–10.5)
CALCIUM SERPL-MCNC: 8.9 MG/DL — SIGNIFICANT CHANGE UP (ref 8.4–10.5)
CHLORIDE SERPL-SCNC: 103 MMOL/L — SIGNIFICANT CHANGE UP (ref 96–108)
CHLORIDE SERPL-SCNC: 104 MMOL/L — SIGNIFICANT CHANGE UP (ref 96–108)
CO2 SERPL-SCNC: 22 MMOL/L — SIGNIFICANT CHANGE UP (ref 22–31)
CO2 SERPL-SCNC: 22 MMOL/L — SIGNIFICANT CHANGE UP (ref 22–31)
CREAT SERPL-MCNC: 1.6 MG/DL — HIGH (ref 0.5–1.3)
CREAT SERPL-MCNC: 1.64 MG/DL — HIGH (ref 0.5–1.3)
GLUCOSE SERPL-MCNC: 132 MG/DL — HIGH (ref 70–99)
GLUCOSE SERPL-MCNC: 142 MG/DL — HIGH (ref 70–99)
HCT VFR BLD CALC: 38.5 % — LOW (ref 39–50)
HGB BLD-MCNC: 12.7 G/DL — LOW (ref 13–17)
MCHC RBC-ENTMCNC: 30.8 PG — SIGNIFICANT CHANGE UP (ref 27–34)
MCHC RBC-ENTMCNC: 33 GM/DL — SIGNIFICANT CHANGE UP (ref 32–36)
MCV RBC AUTO: 93.2 FL — SIGNIFICANT CHANGE UP (ref 80–100)
NRBC # BLD: 0 /100 WBCS — SIGNIFICANT CHANGE UP (ref 0–0)
PLATELET # BLD AUTO: 243 K/UL — SIGNIFICANT CHANGE UP (ref 150–400)
POTASSIUM SERPL-MCNC: 4.5 MMOL/L — SIGNIFICANT CHANGE UP (ref 3.5–5.3)
POTASSIUM SERPL-MCNC: 4.9 MMOL/L — SIGNIFICANT CHANGE UP (ref 3.5–5.3)
POTASSIUM SERPL-SCNC: 4.5 MMOL/L — SIGNIFICANT CHANGE UP (ref 3.5–5.3)
POTASSIUM SERPL-SCNC: 4.9 MMOL/L — SIGNIFICANT CHANGE UP (ref 3.5–5.3)
RBC # BLD: 4.13 M/UL — LOW (ref 4.2–5.8)
RBC # FLD: 12.7 % — SIGNIFICANT CHANGE UP (ref 10.3–14.5)
SODIUM SERPL-SCNC: 137 MMOL/L — SIGNIFICANT CHANGE UP (ref 135–145)
SODIUM SERPL-SCNC: 140 MMOL/L — SIGNIFICANT CHANGE UP (ref 135–145)
WBC # BLD: 19.75 K/UL — HIGH (ref 3.8–10.5)
WBC # FLD AUTO: 19.75 K/UL — HIGH (ref 3.8–10.5)

## 2020-06-11 PROCEDURE — 99232 SBSQ HOSP IP/OBS MODERATE 35: CPT | Mod: GC

## 2020-06-11 RX ORDER — OXYCODONE HYDROCHLORIDE 5 MG/1
1 TABLET ORAL
Qty: 30 | Refills: 0
Start: 2020-06-11 | End: 2020-06-15

## 2020-06-11 RX ORDER — ASPIRIN/CALCIUM CARB/MAGNESIUM 324 MG
1 TABLET ORAL
Qty: 60 | Refills: 0
Start: 2020-06-11 | End: 2020-07-10

## 2020-06-11 RX ORDER — ACETAMINOPHEN 500 MG
2 TABLET ORAL
Qty: 0 | Refills: 0 | DISCHARGE
Start: 2020-06-11

## 2020-06-11 RX ORDER — DICLOFENAC SODIUM/MISOPROSTOL 50 MG-200
1 TABLET,IMMEDIATE,DELAY RELEASE,BIPHASE ORAL
Qty: 0 | Refills: 0 | DISCHARGE

## 2020-06-11 RX ORDER — CELECOXIB 200 MG/1
1 CAPSULE ORAL
Qty: 28 | Refills: 0
Start: 2020-06-11 | End: 2020-06-24

## 2020-06-11 RX ORDER — VALSARTAN 80 MG/1
1 TABLET ORAL
Qty: 7 | Refills: 0
Start: 2020-06-11 | End: 2020-06-17

## 2020-06-11 RX ORDER — MAGNESIUM HYDROXIDE 400 MG/1
30 TABLET, CHEWABLE ORAL
Qty: 0 | Refills: 0 | DISCHARGE
Start: 2020-06-11

## 2020-06-11 RX ADMIN — Medication 15 MILLIGRAM(S): at 00:05

## 2020-06-11 RX ADMIN — Medication 15 MILLIGRAM(S): at 05:53

## 2020-06-11 RX ADMIN — Medication 975 MILLIGRAM(S): at 14:00

## 2020-06-11 RX ADMIN — Medication 12.5 MILLIGRAM(S): at 05:54

## 2020-06-11 RX ADMIN — Medication 325 MILLIGRAM(S): at 05:54

## 2020-06-11 RX ADMIN — Medication 1 MILLIGRAM(S): at 21:39

## 2020-06-11 RX ADMIN — Medication 1 MILLIGRAM(S): at 00:05

## 2020-06-11 RX ADMIN — SODIUM CHLORIDE 120 MILLILITER(S): 9 INJECTION, SOLUTION INTRAVENOUS at 13:46

## 2020-06-11 RX ADMIN — Medication 975 MILLIGRAM(S): at 21:39

## 2020-06-11 RX ADMIN — FINASTERIDE 5 MILLIGRAM(S): 5 TABLET, FILM COATED ORAL at 10:05

## 2020-06-11 RX ADMIN — Medication 15 MILLIGRAM(S): at 12:06

## 2020-06-11 RX ADMIN — Medication 15 MILLIGRAM(S): at 17:04

## 2020-06-11 RX ADMIN — FAMOTIDINE 40 MILLIGRAM(S): 10 INJECTION INTRAVENOUS at 21:39

## 2020-06-11 RX ADMIN — OXYCODONE HYDROCHLORIDE 5 MILLIGRAM(S): 5 TABLET ORAL at 10:05

## 2020-06-11 RX ADMIN — VALSARTAN 160 MILLIGRAM(S): 80 TABLET ORAL at 05:54

## 2020-06-11 RX ADMIN — Medication 325 MILLIGRAM(S): at 17:04

## 2020-06-11 RX ADMIN — Medication 975 MILLIGRAM(S): at 05:54

## 2020-06-11 RX ADMIN — OXYCODONE HYDROCHLORIDE 5 MILLIGRAM(S): 5 TABLET ORAL at 15:51

## 2020-06-11 RX ADMIN — PANTOPRAZOLE SODIUM 40 MILLIGRAM(S): 20 TABLET, DELAYED RELEASE ORAL at 05:54

## 2020-06-11 NOTE — DISCHARGE NOTE PROVIDER - NSDCHHNEEDSERVICE_GEN_ALL_CORE
Observation and assessment/Teaching and training/Wound care and assessment/Other, specify.../Rehabilitation services/Medication teaching and assessment

## 2020-06-11 NOTE — DISCHARGE NOTE PROVIDER - NSDCACTIVITY_GEN_ALL_CORE
Walking - Outdoors allowed/Do not drive or operate machinery/Walking - Indoors allowed/No heavy lifting/straining/Stairs allowed/Showering allowed

## 2020-06-11 NOTE — PROGRESS NOTE ADULT - SUBJECTIVE AND OBJECTIVE BOX
Interval Events: Reviewed  Patient seen and examined at bedside.    Patient is a 69y old  Male who presents with a chief complaint of Left knee pain (11 Jun 2020 11:18)  he is doing well and pain is controlled    PAST MEDICAL & SURGICAL HISTORY:  Gastric ulcer  Hypertension  Left shoulder pain  History of colonoscopy  History of endoscopy  History of torn meniscus of left knee  History of surgery: R shoulder 2002      MEDICATIONS:  Pulmonary:    Antimicrobials:    Anticoagulants:  aspirin enteric coated 325 milliGRAM(s) Oral two times a day    Cardiac:  valsartan 160 milliGRAM(s) Oral daily      Allergies    No Known Allergies    Intolerances        Vital Signs Last 24 Hrs  T(C): 36.2 (11 Jun 2020 16:23), Max: 36.7 (10 Mitch 2020 21:00)  T(F): 97.1 (11 Jun 2020 16:23), Max: 98.1 (10 Mitch 2020 21:00)  HR: 88 (11 Jun 2020 16:23) (74 - 94)  BP: 114/71 (11 Jun 2020 16:23) (108/69 - 149/80)  BP(mean): --  RR: 18 (11 Jun 2020 16:23) (15 - 18)  SpO2: 95% (11 Jun 2020 16:23) (95% - 98%)    06-10 @ 07:01 - 06-11 @ 07:00  --------------------------------------------------------  IN: 1430 mL / OUT: 1380 mL / NET: 50 mL    06-11 @ 07:01 - 06-11 @ 18:27  --------------------------------------------------------  IN: 2380 mL / OUT: 1250 mL / NET: 1130 mL          Review of Systems:   •	General: negative  •	Skin/Breast: negative  •	Ophthalmologic: negative  •	ENMT: negative  •	Respiratory and Thorax: negative  •	Cardiovascular: negative  •	Gastrointestinal: negative  •	Genitourinary: negative  •	Musculoskeletal: negative  •	Neurological: negative  •	Psychiatric: negative  •	Hematology/Lymphatics: negative  •	Endocrine: negative  •	Allergic/Immunologic: negative    Physical Exam:   • Constitutional:	Well-developed, well nourished  • Eyes:	EOMI; PERRL; no drainage or redness  • ENMT:	No oral lesions; no gross abnormalities  • Neck	No bruits; no thyromegaly or nodules  • Breasts:	not examined  • Back:	No deformity or limitation of movement  • Respiratory:	Breath Sounds equal & clear to percussion & auscultation, no accessory muscle use  • Cardiovascular:	Regular rate & rhythm, normal S1, S2; no murmurs, gallops or rubs; no S3, S4  • Gastrointestinal:	Soft, non-tender, no hepatosplenomegaly, normal bowel sounds  • Genitourinary:	not examined  • Rectal: not examined  • Extremities:	No cyanosis, clubbing or edema  • Vascular:	Equal and normal pulses (carotid, femoral, dorsalis pedis)  • Neurologica:l	not examined  • Skin:	No lesions; no rash  • Lymph Nodes:	No lymphadedenopathy  • Musculoskeletal:	No joint pain, swelling or deformity; no limitation of movement        LABS:      CBC Full  -  ( 11 Jun 2020 06:25 )  WBC Count : 19.75 K/uL  RBC Count : 4.13 M/uL  Hemoglobin : 12.7 g/dL  Hematocrit : 38.5 %  Platelet Count - Automated : 243 K/uL  Mean Cell Volume : 93.2 fl  Mean Cell Hemoglobin : 30.8 pg  Mean Cell Hemoglobin Concentration : 33.0 gm/dL  Auto Neutrophil # : x  Auto Lymphocyte # : x  Auto Monocyte # : x  Auto Eosinophil # : x  Auto Basophil # : x  Auto Neutrophil % : x  Auto Lymphocyte % : x  Auto Monocyte % : x  Auto Eosinophil % : x  Auto Basophil % : x    06-11    137  |  104  |  36<H>  ----------------------------<  132<H>  4.9   |  22  |  1.60<H>    Ca    8.9      11 Jun 2020 12:19                          RADIOLOGY & ADDITIONAL STUDIES (The following images were personally reviewed):  Humphries:                                     No  Urine output:                       adequate  DVT prophylaxis:                 Yes  Flattus:                                  Yes  Bowel movement:              No

## 2020-06-11 NOTE — PROGRESS NOTE ADULT - SUBJECTIVE AND OBJECTIVE BOX
SUBJECTIVE: Patient seen and examined.  No major issues overnight.  worked with PT, seen by Medicine.        OBJECTIVE:  NAD  Vital Signs Last 24 Hrs  T(C): 36.7 (10 Mitch 2020 21:00), Max: 36.7 (10 Mitch 2020 21:00)  T(F): 98.1 (10 Mitch 2020 21:00), Max: 98.1 (10 Mitch 2020 21:00)  HR: 74 (10 Mitch 2020 21:00) (74 - 108)  BP: 149/80 (10 Mitch 2020 21:00) (95/53 - 149/80)  BP(mean): 84 (10 Mitch 2020 10:57) (70 - 84)  RR: 16 (10 Mitch 2020 21:00) (14 - 26)  SpO2: 98% (10 Mitch 2020 21:00) (91% - 99%)    Affected extremity:          Dressing: clean/dry/intact            Sensation: SILT         Motor exam: intact TA/GS/EHL         warm well perfused; capillary refill <3 seconds           A/P :  Pt is a 70yo Male s/p  L TKA  -    Pain control  -    DVT ppx: SCD/ASA  -    Weight bearing status: WBAT   -    Physical Therapy  -    Dispo: Home    Td Pierre MD  Senior Orthopaedic Resident  Orthopaedic Surgery

## 2020-06-11 NOTE — DISCHARGE NOTE PROVIDER - NSDCCPTREATMENT_GEN_ALL_CORE_FT
PRINCIPAL PROCEDURE  Procedure: Arthroplasty, knee, left, total  Findings and Treatment: and right knee injection

## 2020-06-11 NOTE — PROGRESS NOTE ADULT - SUBJECTIVE AND OBJECTIVE BOX
Ortho Note    Surgery: left TKA POD #1 with Dr Rea  Patient seen and examined at bedside this AM with Dr Eduardo  Pt reports increased pain this AM as expected   Denies CP, SOB, N/V, numbness/tingling     Vital Signs Last 24 Hrs  T(C): 36.5 (06-11-20 @ 08:44), Max: 36.5 (06-11-20 @ 08:44)  T(F): 97.7 (06-11-20 @ 08:44), Max: 97.7 (06-11-20 @ 08:44)  HR: 81 (06-11-20 @ 08:44) (81 - 93)  BP: 121/76 (06-11-20 @ 08:44) (121/76 - 133/74)  BP(mean): --  RR: 15 (06-11-20 @ 08:44) (15 - 18)  SpO2: 96% (06-11-20 @ 08:44) (96% - 96%)  AVSS    General: Pt Alert and oriented, NAD  Dressing C/D/I: aquacel, kymberly stocking, cryocuff L knee   Pulses: distal pulses present   Sensation: ILT bilateral LE   Motor: EHL/FHL/TA/GS 5/5 bilaterally                           12.7   19.75 )-----------( 243      ( 11 Jun 2020 06:25 )             38.5   11 Jun 2020 06:25    140    |  103    |  35     ----------------------------<  142    4.5     |  22     |  1.64     Ca    8.7        11 Jun 2020 06:25        A/P: 69yMale POD#1 s/p Left TKA     - VSS, Cr 1.64- continue IVF, hold HCTZ, repeat BMP 12pm, if trending down, ok for d/c once PT clears  - if Cr trends up keep patient overnight   - Pain Control continue IV and PO PRN   - DVT ppx: ASA 325mg BID   - PT, WBS:  WBAT  -dispo: home once medically stable and cleared by PT with home PT services     Ortho Pager [phone]

## 2020-06-11 NOTE — DISCHARGE NOTE PROVIDER - NSDCCPCAREPLAN_GEN_ALL_CORE_FT
PRINCIPAL DISCHARGE DIAGNOSIS  Diagnosis: Arthritis  Assessment and Plan of Treatment: improvement s/p Left TKR, Right knee injection

## 2020-06-11 NOTE — DISCHARGE NOTE PROVIDER - NSDCFUADDINST_GEN_ALL_CORE_FT
Weight bear as tolerated with assistive device.  No strenuous activity, heavy lifting, driving or returning to work until cleared by MD.  You may shower - dressing is water-resistant, no soaking in bathtubs.  Remove dressing after post op day 5-7, then leave incision open to air. Keep incision clean and dry.  Try to have regular bowel movements, take stool softener or laxative if necessary.  Swelling may travel all the way down leg to foot, this is normal and will subside in a few weeks.  Call to schedule an appt with Dr. Rea for follow up, if you have staples or sutures they will be removed in office.  Contact your doctor if you experience: fever greater than 101.5, chills, chest pain, difficulty breathing, redness or excessive drainage around the incision, other concerns.  Follow up with your primary care provider. Weight bear as tolerated with assistive device.  No strenuous activity, heavy lifting, driving or returning to work until cleared by MD.  You may shower - dressing is water-resistant, no soaking in bathtubs.  Remove dressing after post op day 5-7, then leave incision open to air. Keep incision clean and dry.  Try to have regular bowel movements, take stool softener or laxative if necessary.  Swelling may travel all the way down leg to foot, this is normal and will subside in a few weeks.  Call to schedule an appt with Dr. Rea for follow up, if you have staples or sutures they will be removed in office.  Contact your doctor if you experience: fever greater than 101.5, chills, chest pain, difficulty breathing, redness or excessive drainage around the incision, other concerns.  You have had an elevated creatinine level during your admission therefore you are to not take your home blood pressure medication (Diovan-HCT) and instead take the medication valsartan as directed which has been prescribed to you.  You are to follow up with your primary care doctor within a week to recheck your blood work and reassess your blood pressure regimen.

## 2020-06-11 NOTE — DISCHARGE NOTE PROVIDER - CARE PROVIDER_API CALL
Boyd Rea  ORTHOPAEDIC SURGERY  [address] [place] [address]  Phone: [phone]  Fax: [phone]  Follow Up Time: 2 weeks

## 2020-06-11 NOTE — DISCHARGE NOTE PROVIDER - HOSPITAL COURSE
Admitted 6/10    Surgery 6/10 Left TKR, Right knee injection    Huyen-op Antibiotics    Pain control    DVT prophylaxis    OOB/Physical Therapy

## 2020-06-11 NOTE — PROGRESS NOTE ADULT - PROBLEM SELECTOR PLAN 1
As controlled. Continue losartan hydrochlorothiazide. Evaluate for discontinuing diuretic postoperatively of the blood pressure is a low normal side.  Hold HTZ

## 2020-06-11 NOTE — DISCHARGE NOTE PROVIDER - NSDCMRMEDTOKEN_GEN_ALL_CORE_FT
Arthrotec 50 mg-200 mcg oral tablet: 1 tab(s) orally 3 times a day  Avodart 0.5 mg oral capsule: 1 cap(s) orally once a day  Dexilant 30 mg oral delayed release capsule: 1 cap(s) orally once a day  Diovan  mg-12.5 mg oral tablet:  orally 2 times a day  Diovan  mg-12.5 mg oral tablet: 1 tab(s) orally once a day  famotidine 40 mg oral tablet: 1 tab(s) orally once a day (at bedtime)  meloxicam 15 mg oral tablet: 1 tab(s) orally once a day  Xanax 1 mg oral tablet: 1 milligram(s) orally once a day (at bedtime) acetaminophen 325 mg oral tablet: 2 tab(s) orally every 8 hours, As Needed for mild pain  aluminum hydroxide-magnesium hydroxide 200 mg-200 mg/5 mL oral suspension: 30 milliliter(s) orally 4 times a day, As needed, Indigestion  aspirin 325 mg oral delayed release tablet: 1 tab(s) orally 2 times a day for 30 days postop  Avodart 0.5 mg oral capsule: 1 cap(s) orally once a day  celecoxib 200 mg oral capsule: 1 cap(s) orally 2 times a day  Dexilant 30 mg oral delayed release capsule: 1 cap(s) orally once a day  famotidine 40 mg oral tablet: 1 tab(s) orally once a day (at bedtime)  magnesium hydroxide 8% oral suspension: 30 milliliter(s) orally once a day, As needed, Constipation  oxyCODONE 5 mg oral tablet: 1 tab(s) orally every 4 hours, As needed, SeverePain MDD:6  valsartan 160 mg oral tablet: 1 tab(s) orally once a day  Xanax 1 mg oral tablet: 1 milligram(s) orally once a day (at bedtime)

## 2020-06-12 ENCOUNTER — TRANSCRIPTION ENCOUNTER (OUTPATIENT)
Age: 70
End: 2020-06-12

## 2020-06-12 VITALS
OXYGEN SATURATION: 99 % | DIASTOLIC BLOOD PRESSURE: 86 MMHG | RESPIRATION RATE: 17 BRPM | TEMPERATURE: 98 F | HEART RATE: 85 BPM | SYSTOLIC BLOOD PRESSURE: 143 MMHG

## 2020-06-12 LAB
ANION GAP SERPL CALC-SCNC: 8 MMOL/L — SIGNIFICANT CHANGE UP (ref 5–17)
BUN SERPL-MCNC: 33 MG/DL — HIGH (ref 7–23)
CALCIUM SERPL-MCNC: 8.3 MG/DL — LOW (ref 8.4–10.5)
CHLORIDE SERPL-SCNC: 109 MMOL/L — HIGH (ref 96–108)
CO2 SERPL-SCNC: 23 MMOL/L — SIGNIFICANT CHANGE UP (ref 22–31)
CREAT SERPL-MCNC: 1.41 MG/DL — HIGH (ref 0.5–1.3)
GLUCOSE SERPL-MCNC: 89 MG/DL — SIGNIFICANT CHANGE UP (ref 70–99)
HCT VFR BLD CALC: 31.9 % — LOW (ref 39–50)
HGB BLD-MCNC: 10.2 G/DL — LOW (ref 13–17)
MCHC RBC-ENTMCNC: 30.3 PG — SIGNIFICANT CHANGE UP (ref 27–34)
MCHC RBC-ENTMCNC: 32 GM/DL — SIGNIFICANT CHANGE UP (ref 32–36)
MCV RBC AUTO: 94.7 FL — SIGNIFICANT CHANGE UP (ref 80–100)
NRBC # BLD: 0 /100 WBCS — SIGNIFICANT CHANGE UP (ref 0–0)
PLATELET # BLD AUTO: 175 K/UL — SIGNIFICANT CHANGE UP (ref 150–400)
POTASSIUM SERPL-MCNC: 4.5 MMOL/L — SIGNIFICANT CHANGE UP (ref 3.5–5.3)
POTASSIUM SERPL-SCNC: 4.5 MMOL/L — SIGNIFICANT CHANGE UP (ref 3.5–5.3)
RBC # BLD: 3.37 M/UL — LOW (ref 4.2–5.8)
RBC # FLD: 13.3 % — SIGNIFICANT CHANGE UP (ref 10.3–14.5)
SODIUM SERPL-SCNC: 140 MMOL/L — SIGNIFICANT CHANGE UP (ref 135–145)
WBC # BLD: 10.37 K/UL — SIGNIFICANT CHANGE UP (ref 3.8–10.5)
WBC # FLD AUTO: 10.37 K/UL — SIGNIFICANT CHANGE UP (ref 3.8–10.5)

## 2020-06-12 PROCEDURE — 86850 RBC ANTIBODY SCREEN: CPT

## 2020-06-12 PROCEDURE — 97161 PT EVAL LOW COMPLEX 20 MIN: CPT

## 2020-06-12 PROCEDURE — 85027 COMPLETE CBC AUTOMATED: CPT

## 2020-06-12 PROCEDURE — 80048 BASIC METABOLIC PNL TOTAL CA: CPT

## 2020-06-12 PROCEDURE — 99232 SBSQ HOSP IP/OBS MODERATE 35: CPT | Mod: GC

## 2020-06-12 PROCEDURE — 82962 GLUCOSE BLOOD TEST: CPT

## 2020-06-12 PROCEDURE — 36415 COLL VENOUS BLD VENIPUNCTURE: CPT

## 2020-06-12 PROCEDURE — 73560 X-RAY EXAM OF KNEE 1 OR 2: CPT

## 2020-06-12 PROCEDURE — 86901 BLOOD TYPING SEROLOGIC RH(D): CPT

## 2020-06-12 PROCEDURE — C1713: CPT

## 2020-06-12 PROCEDURE — C1776: CPT

## 2020-06-12 RX ADMIN — PANTOPRAZOLE SODIUM 40 MILLIGRAM(S): 20 TABLET, DELAYED RELEASE ORAL at 06:28

## 2020-06-12 RX ADMIN — Medication 975 MILLIGRAM(S): at 06:28

## 2020-06-12 RX ADMIN — SODIUM CHLORIDE 120 MILLILITER(S): 9 INJECTION, SOLUTION INTRAVENOUS at 07:20

## 2020-06-12 RX ADMIN — SODIUM CHLORIDE 120 MILLILITER(S): 9 INJECTION, SOLUTION INTRAVENOUS at 00:39

## 2020-06-12 RX ADMIN — VALSARTAN 160 MILLIGRAM(S): 80 TABLET ORAL at 06:28

## 2020-06-12 RX ADMIN — Medication 325 MILLIGRAM(S): at 06:28

## 2020-06-12 RX ADMIN — CELECOXIB 200 MILLIGRAM(S): 200 CAPSULE ORAL at 06:28

## 2020-06-12 NOTE — PROGRESS NOTE ADULT - SUBJECTIVE AND OBJECTIVE BOX
Interval Events: Reviewed  Patient seen and examined at bedside.    Patient is a 69y old  Male who presents with a chief complaint of Left knee pain (12 Jun 2020 06:41)  he is doing OK    PAST MEDICAL & SURGICAL HISTORY:  Gastric ulcer  Hypertension  Left shoulder pain  History of colonoscopy  History of endoscopy  History of torn meniscus of left knee  History of surgery: R shoulder 2002      MEDICATIONS:  Pulmonary:    Antimicrobials:    Anticoagulants:  aspirin enteric coated 325 milliGRAM(s) Oral two times a day    Cardiac:  valsartan 160 milliGRAM(s) Oral daily      Allergies    No Known Allergies    Intolerances        Vital Signs Last 24 Hrs  T(C): 36.6 (12 Jun 2020 09:11), Max: 36.6 (11 Jun 2020 21:04)  T(F): 97.9 (12 Jun 2020 09:11), Max: 97.9 (11 Jun 2020 21:04)  HR: 85 (12 Jun 2020 09:11) (68 - 97)  BP: 143/86 (12 Jun 2020 09:11) (99/62 - 143/86)  BP(mean): 94 (12 Jun 2020 04:57) (94 - 94)  RR: 17 (12 Jun 2020 09:11) (16 - 17)  SpO2: 99% (12 Jun 2020 09:11) (96% - 99%)    06-11 @ 07:01 - 06-12 @ 07:00  --------------------------------------------------------  IN: 4120 mL / OUT: 3050 mL / NET: 1070 mL    06-12 @ 07:01 - 06-12 @ 20:41  --------------------------------------------------------  IN: 360 mL / OUT: 500 mL / NET: -140 mL          Review of Systems:   •	General: negative  •	Skin/Breast: negative  •	Ophthalmologic: negative  •	ENMT: negative  •	Respiratory and Thorax: negative  •	Cardiovascular: negative  •	Gastrointestinal: negative  •	Genitourinary: negative  •	Musculoskeletal: negative  •	Neurological: negative  •	Psychiatric: negative  •	Hematology/Lymphatics: negative  •	Endocrine: negative  •	Allergic/Immunologic: negative    Physical Exam:   • Constitutional:	Well-developed, well nourished  • Eyes:	EOMI; PERRL; no drainage or redness  • ENMT:	No oral lesions; no gross abnormalities  • Neck	No bruits; no thyromegaly or nodules  • Breasts:	not examined  • Back:	No deformity or limitation of movement  • Respiratory:	Breath Sounds equal & clear to percussion & auscultation, no accessory muscle use  • Cardiovascular:	Regular rate & rhythm, normal S1, S2; no murmurs, gallops or rubs; no S3, S4  • Gastrointestinal:	Soft, non-tender, no hepatosplenomegaly, normal bowel sounds  • Genitourinary:	not examined  • Rectal: not examined  • Extremities:	No cyanosis, clubbing or edema  • Vascular:	Equal and normal pulses (carotid, femoral, dorsalis pedis)  • Neurologica:l	not examined  • Skin:	No lesions; no rash  • Lymph Nodes:	No lymphadedenopathy  • Musculoskeletal:	No joint pain, swelling or deformity; no limitation of movement        LABS:      CBC Full  -  ( 12 Jun 2020 06:59 )  WBC Count : 10.37 K/uL  RBC Count : 3.37 M/uL  Hemoglobin : 10.2 g/dL  Hematocrit : 31.9 %  Platelet Count - Automated : 175 K/uL  Mean Cell Volume : 94.7 fl  Mean Cell Hemoglobin : 30.3 pg  Mean Cell Hemoglobin Concentration : 32.0 gm/dL  Auto Neutrophil # : x  Auto Lymphocyte # : x  Auto Monocyte # : x  Auto Eosinophil # : x  Auto Basophil # : x  Auto Neutrophil % : x  Auto Lymphocyte % : x  Auto Monocyte % : x  Auto Eosinophil % : x  Auto Basophil % : x    06-12    140  |  109<H>  |  33<H>  ----------------------------<  89  4.5   |  23  |  1.41<H>    Ca    8.3<L>      12 Jun 2020 06:59                          RADIOLOGY & ADDITIONAL STUDIES (The following images were personally reviewed):  Humphries:                                     No  Urine output:                       adequate  DVT prophylaxis:                 Yes  Flattus:                                  Yes  Bowel movement:              No

## 2020-06-12 NOTE — DISCHARGE NOTE NURSING/CASE MANAGEMENT/SOCIAL WORK - PATIENT PORTAL LINK FT
You can access the FollowMyHealth Patient Portal offered by [facility] by registering at the following website: [email or web address]. By joining [employer]’s FollowMyHealth portal, you will also be able to view your health information using other applications (apps) compatible with our system.

## 2020-06-12 NOTE — PROGRESS NOTE ADULT - REASON FOR ADMISSION
Left knee pain

## 2020-06-12 NOTE — PROGRESS NOTE ADULT - SUBJECTIVE AND OBJECTIVE BOX
SUBJECTIVE: Patient seen and examined.  No major issues overnight.  worked with PT, seen by Medicine.        OBJECTIVE:  NAD  Vital Signs Last 24 Hrs  T(C): 36.6 (12 Jun 2020 04:57), Max: 36.6 (11 Jun 2020 13:30)  T(F): 97.9 (12 Jun 2020 04:57), Max: 97.9 (11 Jun 2020 21:04)  HR: 68 (12 Jun 2020 04:57) (68 - 97)  BP: 99/62 (12 Jun 2020 04:57) (99/62 - 121/76)  BP(mean): 94 (12 Jun 2020 04:57) (94 - 94)  RR: 17 (12 Jun 2020 04:57) (15 - 18)  SpO2: 96% (12 Jun 2020 04:57) (95% - 97%)    Affected extremity:          Dressing: clean/dry/intact            Sensation: SILT         Motor exam: intact TA/GS/EHL         warm well perfused; capillary refill <3 seconds           A/P :  Pt is a 68yo Male s/p  L TKA  -    Pain control  -    DVT ppx: SCD/ASA  -    Weight bearing status: WBAT   -    Physical Therapy  -    Dispo: Home    Td Pierre MD  Senior Orthopaedic Resident  Orthopaedic Surgery

## 2020-06-12 NOTE — PROGRESS NOTE ADULT - ATTENDING COMMENTS
Patient seen and examined with house-staff during bedside rounds.  Resident note read, including vitals, physical findings, laboratory data, and radiological reports.   Revisions included below.  Direct personal management at bed side and extensive interpretation of the data.  Plan was outlined and discussed in details with the housestaff.  Decision making of high complexity  Action taken for acute disease activity to reflect the level of care provided:  - medication reconciliation  - review laboratory data  I reviewed the postoperative notes including normal chest x-ray and EKG

## 2020-06-17 DIAGNOSIS — R79.89 OTHER SPECIFIED ABNORMAL FINDINGS OF BLOOD CHEMISTRY: ICD-10-CM

## 2020-06-17 DIAGNOSIS — K25.9 GASTRIC ULCER, UNSPECIFIED AS ACUTE OR CHRONIC, WITHOUT HEMORRHAGE OR PERFORATION: ICD-10-CM

## 2020-06-17 DIAGNOSIS — I10 ESSENTIAL (PRIMARY) HYPERTENSION: ICD-10-CM

## 2020-06-17 DIAGNOSIS — Z98.890 OTHER SPECIFIED POSTPROCEDURAL STATES: ICD-10-CM

## 2020-06-17 DIAGNOSIS — M25.569 PAIN IN UNSPECIFIED KNEE: ICD-10-CM

## 2020-06-17 DIAGNOSIS — M17.12 UNILATERAL PRIMARY OSTEOARTHRITIS, LEFT KNEE: ICD-10-CM

## 2022-03-15 NOTE — PROGRESS NOTE ADULT - PROBLEM SELECTOR PROBLEM 1
Linnea Muro is a 51 year old female here for an annual gynecological examination.    Last Pap 01/21/2019   Last Mammogram 01/14/2021 (scheduled 03/17/22)  Birth Control tubal ligation    Concerns: None  Refills needed: None    Reports known Latex allergy or symptoms of Latex sensitivity.  Medication verified, no changes    Recent Review Flowsheet Data     Date 3/15/2022    Adult PHQ 2 Score 0    Adult PHQ 2 Interpretation No further screening needed    Little interest or pleasure in activity? Not at all    Feeling down, depressed or hopeless? Not at all          No Chaperone desired.     Elevated BP x3 in clinic. Message sent to PCP (Dr. Bhatia) to advise. Instructed to patient to contact PCP in 48hrs if she has not had a response.      Essential hypertension

## 2022-10-25 NOTE — PRE-OP CHECKLIST - LOOSE TEETH
Pain Service Daily Progress Note     Patient: Lucy Pugh Date: 10/25/2022   : 1964 Attending: Dr. Aaron Sparks     Room/Bed: 5015            Chief Complaint: Back pain and left hip pain     Subjective: She is POD#5 extension of previous L2-S1 lumbar fusion to L1 ([place], 10/19/22). Patient seen today and feeling better. Minimal back pain and left hip pain.  Doing well with oral pain regimen and feels ready to go home. Denies any new HA, CP, SOB, weakness, or  bowel/bladder dysfunction.    Visit Vitals  /73 (BP Location: RUE - Right upper extremity, Patient Position: Supine)   Pulse 72   Temp 98.8 °F (37.1 °C) (Oral)   Resp 16   Ht 5' 8\" (1.727 m)   Wt 130 kg (286 lb 9.6 oz)   SpO2 93%   BMI 43.58 kg/m²       Current Facility-Administered Medications   Medication   • metFORMIN (GLUCOPHAGE) tablet 1,000 mg   • [START ON 10/26/2022] lisinopril (ZESTRIL) tablet 10 mg   • oxyCODONE (IMM REL) (ROXICODONE) tablet 15 mg    Or   • oxyCODONE (IMM REL) (ROXICODONE) tablet 5 mg   • sodium chloride 0.9% infusion   • diazePAM (VALIUM) tablet 10 mg   • loratadine (CLARITIN) tablet 10 mg   • docusate sodium (COLACE) capsule 100 mg   • acetaminophen (TYLENOL) tablet 650 mg   • hydrALAZINE (APRESOLINE) injection 10 mg   • morphine injection 2 mg   • nalbuphine (NUBAIN) injection 2.5 mg   • naLOXone (NARCAN) injection 0.1 mg   • sodium chloride 0.9% infusion   • atorvastatin (LIPITOR) tablet 10 mg   • baclofen (LIORESAL) tablet 20 mg   • DULoxetine (CYMBALTA) capsule 30 mg   • gabapentin (NEURONTIN) capsule 900 mg   • oxyCODONE SR (OxyCONTIN) 12 hr tablet 30 mg   • tiZANidine (ZANAFLEX) tablet 4 mg   • dextrose 50 % injection 25 g   • dextrose 50 % injection 12.5 g   • glucagon (GLUCAGEN) injection 1 mg   • dextrose (GLUTOSE) 40 % gel 15 g   • dextrose (GLUTOSE) 40 % gel 30 g   • sodium chloride 0.9 % flush bag 25 mL   • sodium chloride (PF) 0.9 % injection 2 mL   • sodium chloride 0.9 % flush bag 25 mL   • heparin  (porcine) injection 5,000 Units   • Potassium Standard Replacement Protocol (Levels 3.5 and lower)   • Magnesium Standard Replacement Protocol   • bisacodyl (DULCOLAX) suppository 10 mg   • polyethylene glycol (MIRALAX) packet 17 g       Physical Exam:      General: Alert, cooperative, NAD  Head: Normocephalic, No obvious abnormality, atraumatic  Eyes: PERRLA, EOM intact, Conjunctiva clear  Neck: supple, symmetrical, trachea midline  Lungs: respirations unlabored  Abdomen: Soft, non distended  Extremities: Normal, atraumatic, no cyanosis or edema  Pulses: 2+, Symmetric all extremities  Skin: No rashes or lesions noted  Neuro:   CN II-XII grossly intact.   Motor exam:   LLE 5/5 throughout major muscle groups,   RLE 4/5 throughout  No sensation to LT throughout RLE (chronic). Intact in the LLE throughout.      Plan/Recomendations:     1) Patient doing much better today and is not requiring IV morphine (last dose this AM). Will discontinue IV Morphine. She is agreeable with staying on oral pain meds and following up with Dr. Polanco outpatient. She did have appt on 11/8/22, however, I called his clinic and they are able to have the patient moved up when discharged from hospital.  2) Continue Oxycontin 30 mg Q12 HR. Will wean oxycodone 15 mg to Q6HR PRN (was Q4HR) for today. Continue other medications as prescribed.   3) Discharge plan per primary team. We will sign off. Please page with any questions.     Thank you very much for involving us in this patient's care. Case discussed with Dr. Sparks who agrees with plan.     Domingo Perales PA-C  [facility]     no

## 2022-12-30 NOTE — DISCHARGE NOTE ADULT - MEDICATION SUMMARY - MEDICATIONS TO STOP TAKING
I will STOP taking the medications listed below when I get home from the hospital:  None
MOLECULAR PCR

## 2023-06-14 NOTE — DISCHARGE NOTE ADULT - REASON FOR ADMISSION
Problem: At Risk for Falls  Goal: # Patient does not fall  Outcome: Outcome Met, Continue evaluating goal progress toward completion  Note: Patient able to make needs known. Uses call light for needs. No impulsive behavior noted. Ensure bed/chair alarm in place. Purposeful rounding done to ensure needs are met. Reminded patient to call staff with needs.     Problem: Pressure Injury, Risk for  Goal: # Skin remains intact  Outcome: Outcome Met, Continue evaluating goal progress toward completion  Note: No new skin issue noted. Patient able to reposition self independently. Continue to encourage patient to shift weight while up in the bed/chair. Ensure patient remain clean and dry.      Problem: Non-Pressure Injury Wound  Goal: # No deterioration in wound  Outcome: Outcome Met, Continue evaluating goal progress toward completion  Note: Wound to Superior head approximated with intact staples. No drainage or signs of infection noted. Remain with minimal swelling to surrounding wound.       L Shoulder pain Left shoulder pain/ left total shoulder replacement 7/26/17

## 2023-06-19 NOTE — H&P ADULT - NSCORESITESY/N_GEN_A_CORE_RD
No Detail Level: Detailed Patient Specific Counseling (Will Not Stick From Patient To Patient): BRIGHT EYE by SM\\nSUBLATIVE\\nANTI-HISTAMINES

## 2024-04-12 ENCOUNTER — APPOINTMENT (OUTPATIENT)
Dept: ORTHOPEDIC SURGERY | Facility: CLINIC | Age: 74
End: 2024-04-12
Payer: MEDICARE

## 2024-04-12 DIAGNOSIS — M54.16 RADICULOPATHY, LUMBAR REGION: ICD-10-CM

## 2024-04-12 PROBLEM — Z00.00 ENCOUNTER FOR PREVENTIVE HEALTH EXAMINATION: Status: ACTIVE | Noted: 2024-04-12

## 2024-04-12 PROCEDURE — 99202 OFFICE O/P NEW SF 15 MIN: CPT

## 2024-04-12 NOTE — HISTORY OF PRESENT ILLNESS
[de-identified] :  73 year M presents with complaint of atraumatic LBP x 1 year. Radiation: Rt hip. Onset: atraumatic. Pain is described as constant, 9/10. Symptoms are worse with walking, standing up from prolonged sitting. Symptoms improve with rest, sitting, diclofenac. Symptoms have worsened since onset. Reports LBP did not used to bother him while walking but it now does.  Denies leg pain,  numbness, tingling, or weakness. Name band;

## 2024-04-12 NOTE — ASSESSMENT
[FreeTextEntry1] : 73 year M presents with complaint of atraumatic LBP x 1 year. Radiation: Rt hip. Reports LBP did not used to bother him while walking but it now does.

## 2024-04-12 NOTE — PLAN
[TextEntry] : - lumbar MRI - f/u with  Dr. Daly once imaging is completed.  Call the office with any questions, concerns, or worsening symptoms. Plan was discussed with patient whom expressed understanding and agreed with plan.

## 2024-04-12 NOTE — REASON FOR VISIT
[Initial Visit] : an initial visit for [Back Pain] : back pain [Home] : at home, [unfilled] , at the time of the visit. [Other Location: e.g. Home (Enter Location, City,State)___] : at [unfilled] [Patient] : the patient [Self] : self [This encounter was initiated by telehealth (audio with video) and converted to telephone (audio only) due to technical difficulties.] : This encounter was initiated by telehealth (audio with video) and converted to telephone (audio only) due to technical difficulties.

## 2024-05-01 NOTE — OCCUPATIONAL THERAPY INITIAL EVALUATION ADULT - FINE MOTOR COORDINATION, LEFT HAND THUMB/FINGER OPPOSITION SKILLS, OT EVAL
MD Geena Morel; [facility]  Please let Mr.Neal Walden know that I placed the orders for MRA neck with and without and MRA head without studies. He should be able to schedule it now.          Previous Messages    
normal performance

## 2024-06-06 ENCOUNTER — APPOINTMENT (OUTPATIENT)
Dept: ORTHOPEDIC SURGERY | Facility: CLINIC | Age: 74
End: 2024-06-06
Payer: MEDICARE

## 2024-06-06 DIAGNOSIS — M48.061 SPINAL STENOSIS, LUMBAR REGION WITHOUT NEUROGENIC CLAUDICATION: ICD-10-CM

## 2024-06-06 PROCEDURE — 99212 OFFICE O/P EST SF 10 MIN: CPT | Mod: 57

## 2024-06-06 PROCEDURE — 72110 X-RAY EXAM L-2 SPINE 4/>VWS: CPT

## 2024-06-07 PROBLEM — M48.061 SPINAL STENOSIS OF LUMBAR REGION, UNSPECIFIED WHETHER NEUROGENIC CLAUDICATION PRESENT: Status: ACTIVE | Noted: 2024-06-07

## 2024-06-07 NOTE — PHYSICAL EXAM
[de-identified] : For Sebastian Gill, we performed x-rays, which show solid arthrodesis extending from the L2 to the pelvis.   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

## 2024-06-07 NOTE — HISTORY OF PRESENT ILLNESS
[de-identified] : Sebastian is here with his son. In regards to the dad, he has daily severe pain, recalcitrant to conservative treatment, including medication, physiotherapy, pain management, activity modification, home exercise program, and weight loss. He has also had multiple facet blocks and epidural steroid injections with fleeting relief. He has daily severe back pain. 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 He was tearful based on the severity of his symptoms and the significant loss of function.

## 2024-06-07 NOTE — END OF VISIT
[FreeTextEntry3] : I Radha Solorio, acting as scribe, attest that this documentation has been prepared under the direction and in the presence of Provider Gino Daly MD.   I was physically present during the service to the patient and/or personally examined the patient and I was directly involved in the management plan and recommendations of the care provided to the patient.   I Dr. Daly, reviewed the history, the physical exam, and plan as documented by the PA. The documentation recorded by the PA, in my presence, accurately reflects the service I personally performed, and the decisions made by me with my edits as appropriate.  Gino Daly MD  Documented by Ramona Lynn acting as a scribe for Dr. Gino Daly. 06/06/2024   All medical record entries made by the Scribe were at my, Dr. Gino Daly. direction and personally dictated by me on 06/06/2024. I have reviewed the chart and agree that the record accurately reflects my personal performance of the history, physical exam, assessment and plan. I have also personally directed, reviewed, and agreed with the chart.

## 2024-06-07 NOTE — PHYSICAL EXAM
[de-identified] : For Sebastian Gill, we performed x-rays, which show solid arthrodesis extending from the L2 to the pelvis.   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

## 2024-06-07 NOTE — PLAN
[TextEntry] : Based on the severity of his symptoms as well as their recalcitrant nature, I've recommended decompression, fusion, and TLIFs L2 to the pelvis. The risks, alternatives, and benefits of which were explained in detail to the patient as well as his son. They know me well. They are anxious to proceed. They verbalize understanding of the risks, alternatives, and benefits, including but not limited to, the risk of persistent symptoms, worsening symptoms, numbness, tingling, pins, needles, weakness, pain, paralysis, nerve injury, dural tear, infection, bleeding, heart attack, stroke, pulmonary embolism, DVT, drill tear, epidural hematoma, infection, need-free operation, adjacent segment degeneration, and other.

## 2024-06-07 NOTE — HISTORY OF PRESENT ILLNESS
[de-identified] : Sebastian is here with his son. In regards to the dad, he has daily severe pain, recalcitrant to conservative treatment, including medication, physiotherapy, pain management, activity modification, home exercise program, and weight loss. He has also had multiple facet blocks and epidural steroid injections with fleeting relief. He has daily severe back pain. 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 He was tearful based on the severity of his symptoms and the significant loss of function.

## 2024-07-26 VITALS
TEMPERATURE: 97 F | HEIGHT: 68 IN | RESPIRATION RATE: 16 BRPM | OXYGEN SATURATION: 99 % | SYSTOLIC BLOOD PRESSURE: 146 MMHG | WEIGHT: 209.44 LBS | DIASTOLIC BLOOD PRESSURE: 81 MMHG | HEART RATE: 74 BPM

## 2024-07-26 RX ORDER — POVIDONE-IODINE 0.1 G/ML
1 SOLUTION TOPICAL ONCE
Refills: 0 | Status: COMPLETED | OUTPATIENT
Start: 2024-07-29 | End: 2024-07-29

## 2024-07-26 NOTE — H&P ADULT - ASSESSMENT
73y M with back pain presenting for elective L2-pelvis laminectomy, posterior spinal fusion, transforaminal lumbar interbody fusion with Dr. Daly.

## 2024-07-26 NOTE — H&P ADULT - NSHPPHYSICALEXAM_GEN_ALL_CORE
lower back: Decreased ROM secondary to pain MSK: Decreased lower back ROM secondary to pain  Skin without erythema, ecchymosis, abrasions or lesion  Calves soft, nontender bilaterally   Sensation intact to light touch bilateral lower extremities  Pulses: +2DP, brisk capillary refill  Motor  L2 (hip flexion) 5/5  L3 (knee extension) 5/5  L4 (ankle dorsiflexion) 5/5  L5 (long toe extension) 5/5  S1 (ankle plantar flexion) 5/5    Rest of PE per MD clearance

## 2024-07-26 NOTE — PATIENT PROFILE ADULT - FALL HARM RISK - HARM RISK INTERVENTIONS

## 2024-07-26 NOTE — PATIENT PROFILE ADULT - NSPROIMPLANTSMEDDEV_GEN_A_NUR
left shoulder/Artificial joint/Vagal nerve stimulator left shoulder ,L TKR/Artificial joint/Vagal nerve stimulator

## 2024-07-26 NOTE — H&P ADULT - NSICDXPASTMEDICALHX_GEN_ALL_CORE_FT
PAST MEDICAL HISTORY:  Gastric ulcer     Hypertension not on meds    Left shoulder pain resolved post surgery

## 2024-07-26 NOTE — H&P ADULT - NSICDXPASTSURGICALHX_GEN_ALL_CORE_FT
PAST SURGICAL HISTORY:  H/O total knee replacement, left     History of colonoscopy     History of endoscopy     History of surgery L shoulder replacent 2002    History of torn meniscus of left knee

## 2024-07-26 NOTE — H&P ADULT - HISTORY OF PRESENT ILLNESS
73y M with c/o back pain x    Presents for elective L2-pelvis laminectomy, posterior spinal fusion, transforaminal lumbar interbody fusion with Dr. Daly. 73y M with c/o severe back pain x 1 year. He reports that the pain began spontaneously and without accident or trauma. Pt says that the pain persists and/or is worsening despite conservative measures of physical therapy and increasingly unresponsive to use of medications. Pt ambulates without the use of a cane or walker for assistance at home. Denies numbness/tingling of lower extremities. He is unable to ambulate more than 2 blocks being limited by pain.     Denies history of blood clots or seizures. Denies chest pain, shortness of breath, nausea or vomiting today.     Presents for elective L2-pelvis laminectomy, posterior spinal fusion, transforaminal lumbar interbody fusion with Dr. Daly.

## 2024-07-26 NOTE — H&P ADULT - NSHPLABSRESULTS_GEN_ALL_CORE
Preop cbc, bmp, pt/inr, ptt, ua wnl reviewed by medical clearance  Cr 1.49, H+H 12.3/36.8  preop ekg wnl per clearance  3M DOS

## 2024-07-26 NOTE — H&P ADULT - PROBLEM SELECTOR PLAN 1
Admit to orthopedics  elective L2-pelvis laminectomy, posterior spinal fusion, transforaminal lumbar interbody fusion with Dr. Daly.  medically cleared by Dr. Mcgarry for surgery

## 2024-07-28 ENCOUNTER — TRANSCRIPTION ENCOUNTER (OUTPATIENT)
Age: 74
End: 2024-07-28

## 2024-07-29 ENCOUNTER — APPOINTMENT (OUTPATIENT)
Dept: ORTHOPEDIC SURGERY | Facility: HOSPITAL | Age: 74
End: 2024-07-29

## 2024-07-29 ENCOUNTER — INPATIENT (INPATIENT)
Facility: HOSPITAL | Age: 74
LOS: 3 days | Discharge: HOME | End: 2024-08-02
Payer: MEDICARE

## 2024-07-29 DIAGNOSIS — Z98.890 OTHER SPECIFIED POSTPROCEDURAL STATES: Chronic | ICD-10-CM

## 2024-07-29 DIAGNOSIS — Z87.39 PERSONAL HISTORY OF OTHER DISEASES OF THE MUSCULOSKELETAL SYSTEM AND CONNECTIVE TISSUE: ICD-10-CM

## 2024-07-29 DIAGNOSIS — Z87.828 PERSONAL HISTORY OF OTHER (HEALED) PHYSICAL INJURY AND TRAUMA: Chronic | ICD-10-CM

## 2024-07-29 DIAGNOSIS — Z96.652 PRESENCE OF LEFT ARTIFICIAL KNEE JOINT: Chronic | ICD-10-CM

## 2024-07-29 DIAGNOSIS — I10 ESSENTIAL (PRIMARY) HYPERTENSION: ICD-10-CM

## 2024-07-29 DIAGNOSIS — K25.9 GASTRIC ULCER, UNSPECIFIED AS ACUTE OR CHRONIC, WITHOUT HEMORRHAGE OR PERFORATION: ICD-10-CM

## 2024-07-29 LAB
BLD GP AB SCN SERPL QL: NEGATIVE — SIGNIFICANT CHANGE UP
RH IG SCN BLD-IMP: POSITIVE — SIGNIFICANT CHANGE UP

## 2024-07-29 PROCEDURE — 22853 INSJ BIOMECHANICAL DEVICE: CPT

## 2024-07-29 PROCEDURE — 63047 LAM FACETEC & FORAMOT LUMBAR: CPT | Mod: 59

## 2024-07-29 PROCEDURE — 22633 ARTHRD CMBN 1NTRSPC LUMBAR: CPT

## 2024-07-29 PROCEDURE — 63052 LAM FACETC/FRMT ARTHRD LUM 1: CPT | Mod: 59

## 2024-07-29 PROCEDURE — 22842 INSERT SPINE FIXATION DEVICE: CPT

## 2024-07-29 PROCEDURE — 22614 ARTHRD PST TQ 1NTRSPC EA ADD: CPT

## 2024-07-29 PROCEDURE — 22848 INSERT PELV FIXATION DEVICE: CPT

## 2024-07-29 PROCEDURE — 63056 DECOMPRESS SPINAL CORD LMBR: CPT | Mod: 59

## 2024-07-29 DEVICE — SCREW CORT VIPER FIX TI 6X45MM: Type: IMPLANTABLE DEVICE | Status: FUNCTIONAL

## 2024-07-29 DEVICE — GRAFT BONE INFUSE KIT LG II: Type: IMPLANTABLE DEVICE | Status: FUNCTIONAL

## 2024-07-29 DEVICE — SURGIFOAM PAD 8CM X 12.5CM X 10MM (100): Type: IMPLANTABLE DEVICE | Status: FUNCTIONAL

## 2024-07-29 DEVICE — ROD 480MM: Type: IMPLANTABLE DEVICE | Status: FUNCTIONAL

## 2024-07-29 DEVICE — GWIRE NITINOL BLUNT 1.45X490MM: Type: IMPLANTABLE DEVICE | Status: FUNCTIONAL

## 2024-07-29 DEVICE — SCREW VIPER POLY FT 9X90MM: Type: IMPLANTABLE DEVICE | Status: FUNCTIONAL

## 2024-07-29 DEVICE — SURGIFLO HEMOSTATIC MATRIX KIT: Type: IMPLANTABLE DEVICE | Status: FUNCTIONAL

## 2024-07-29 DEVICE — SPACER SABLE 15 DEG 10X26X7-14MM: Type: IMPLANTABLE DEVICE | Status: FUNCTIONAL

## 2024-07-29 DEVICE — SCREW ST MIS SNGL INNER VIPER: Type: IMPLANTABLE DEVICE | Status: FUNCTIONAL

## 2024-07-29 DEVICE — SCREW CORT VIPER FIX TI 6X50MM: Type: IMPLANTABLE DEVICE | Status: FUNCTIONAL

## 2024-07-29 RX ORDER — ACETAMINOPHEN 500 MG
1000 TABLET ORAL ONCE
Refills: 0 | Status: COMPLETED | OUTPATIENT
Start: 2024-07-29 | End: 2024-07-29

## 2024-07-29 RX ORDER — HYDROMORPHONE HCL IN 0.9% NACL 0.2 MG/ML
0.5 PLASTIC BAG, INJECTION (ML) INTRAVENOUS EVERY 6 HOURS
Refills: 0 | Status: DISCONTINUED | OUTPATIENT
Start: 2024-07-29 | End: 2024-08-02

## 2024-07-29 RX ORDER — ACETAMINOPHEN 500 MG
1000 TABLET ORAL EVERY 8 HOURS
Refills: 0 | Status: DISCONTINUED | OUTPATIENT
Start: 2024-07-29 | End: 2024-08-02

## 2024-07-29 RX ORDER — ONDANSETRON HCL/PF 4 MG/2 ML
4 VIAL (ML) INJECTION EVERY 6 HOURS
Refills: 0 | Status: DISCONTINUED | OUTPATIENT
Start: 2024-07-29 | End: 2024-07-30

## 2024-07-29 RX ORDER — APREPITANT 40 MG
40 CAPSULE ORAL DAILY
Refills: 0 | Status: DISCONTINUED | OUTPATIENT
Start: 2024-07-29 | End: 2024-07-29

## 2024-07-29 RX ORDER — OXYCODONE HYDROCHLORIDE 30 MG/1
10 TABLET ORAL EVERY 4 HOURS
Refills: 0 | Status: DISCONTINUED | OUTPATIENT
Start: 2024-07-29 | End: 2024-08-02

## 2024-07-29 RX ORDER — CEFAZOLIN SODIUM 10 G
2000 VIAL (EA) INJECTION EVERY 8 HOURS
Refills: 0 | Status: COMPLETED | OUTPATIENT
Start: 2024-07-29 | End: 2024-07-30

## 2024-07-29 RX ORDER — MAGNESIUM HYDROXIDE 400 MG/5ML
30 SUSPENSION, ORAL (FINAL DOSE FORM) ORAL EVERY 12 HOURS
Refills: 0 | Status: DISCONTINUED | OUTPATIENT
Start: 2024-07-29 | End: 2024-08-02

## 2024-07-29 RX ORDER — PANTOPRAZOLE SODIUM 20 MG/1
40 TABLET, DELAYED RELEASE ORAL
Refills: 0 | Status: DISCONTINUED | OUTPATIENT
Start: 2024-07-29 | End: 2024-08-02

## 2024-07-29 RX ORDER — HYDROMORPHONE HCL IN 0.9% NACL 0.2 MG/ML
0.5 PLASTIC BAG, INJECTION (ML) INTRAVENOUS
Refills: 0 | Status: DISCONTINUED | OUTPATIENT
Start: 2024-07-29 | End: 2024-07-30

## 2024-07-29 RX ORDER — CHLORHEXIDINE GLUCONATE 500 MG/1
1 CLOTH TOPICAL EVERY 12 HOURS
Refills: 0 | Status: DISCONTINUED | OUTPATIENT
Start: 2024-07-29 | End: 2024-07-29

## 2024-07-29 RX ORDER — METHOCARBAMOL 500 MG
500 TABLET ORAL EVERY 8 HOURS
Refills: 0 | Status: DISCONTINUED | OUTPATIENT
Start: 2024-07-29 | End: 2024-08-02

## 2024-07-29 RX ORDER — LORATADINE 10 MG
17 TABLET,DISINTEGRATING ORAL DAILY
Refills: 0 | Status: DISCONTINUED | OUTPATIENT
Start: 2024-07-29 | End: 2024-08-02

## 2024-07-29 RX ORDER — DEXTROSE MONOHYDRATE, SODIUM CHLORIDE, SODIUM LACTATE, CALCIUM CHLORIDE, MAGNESIUM CHLORIDE 1.5; 538; 448; 18.4; 5.08 G/100ML; MG/100ML; MG/100ML; MG/100ML; MG/100ML
1000 SOLUTION INTRAPERITONEAL
Refills: 0 | Status: DISCONTINUED | OUTPATIENT
Start: 2024-07-29 | End: 2024-08-02

## 2024-07-29 RX ORDER — OXYCODONE HYDROCHLORIDE 30 MG/1
5 TABLET ORAL EVERY 4 HOURS
Refills: 0 | Status: DISCONTINUED | OUTPATIENT
Start: 2024-07-29 | End: 2024-08-02

## 2024-07-29 RX ORDER — SENNOSIDES 8.6 MG/1
2 TABLET ORAL AT BEDTIME
Refills: 0 | Status: DISCONTINUED | OUTPATIENT
Start: 2024-07-29 | End: 2024-08-02

## 2024-07-29 RX ORDER — ALPRAZOLAM 0.5 MG
1 TABLET ORAL AT BEDTIME
Refills: 0 | Status: DISCONTINUED | OUTPATIENT
Start: 2024-07-29 | End: 2024-08-02

## 2024-07-29 RX ORDER — BENZOCAINE AND MENTHOL 5; 1 G/100ML; G/100ML
1 LIQUID ORAL DAILY
Refills: 0 | Status: DISCONTINUED | OUTPATIENT
Start: 2024-07-29 | End: 2024-08-02

## 2024-07-29 RX ADMIN — Medication 1000 MILLIGRAM(S): at 14:38

## 2024-07-29 RX ADMIN — Medication 40 MILLIGRAM(S): at 07:13

## 2024-07-29 RX ADMIN — Medication 1 MILLIGRAM(S): at 22:27

## 2024-07-29 RX ADMIN — Medication 1000 MILLIGRAM(S): at 07:21

## 2024-07-29 RX ADMIN — Medication 50 MILLIGRAM(S): at 14:37

## 2024-07-29 RX ADMIN — Medication 500 MILLIGRAM(S): at 21:13

## 2024-07-29 RX ADMIN — OXYCODONE HYDROCHLORIDE 10 MILLIGRAM(S): 30 TABLET ORAL at 21:13

## 2024-07-29 RX ADMIN — Medication 1000 MILLIGRAM(S): at 21:13

## 2024-07-29 RX ADMIN — Medication 1000 MILLIGRAM(S): at 07:09

## 2024-07-29 RX ADMIN — OXYCODONE HYDROCHLORIDE 10 MILLIGRAM(S): 30 TABLET ORAL at 22:13

## 2024-07-29 RX ADMIN — POVIDONE-IODINE 1 APPLICATION(S): 0.1 SOLUTION TOPICAL at 07:15

## 2024-07-29 RX ADMIN — Medication 100 MILLIGRAM(S): at 17:44

## 2024-07-29 RX ADMIN — OXYCODONE HYDROCHLORIDE 5 MILLIGRAM(S): 30 TABLET ORAL at 17:59

## 2024-07-29 RX ADMIN — Medication 500 MILLIGRAM(S): at 14:38

## 2024-07-29 RX ADMIN — OXYCODONE HYDROCHLORIDE 5 MILLIGRAM(S): 30 TABLET ORAL at 16:45

## 2024-07-29 NOTE — PROGRESS NOTE ADULT - SUBJECTIVE AND OBJECTIVE BOX
Orthopedics Post Op Check    Procedure: L2-pelvis laminectomy, posterior spinal fusion, transforaminal lumbar interbody on 7/29  Surgeon: Dr. Daly    Pt laying comfortable in bed on oxygen per anesthesia due to administration of narcan post-op. Pt has no acute complaints.   Denies CP, SOB, N/V, numbness/tingling in b/l LE.     Vital Signs Last 24 Hrs  T(C): 36.7 (29 Jul 2024 12:47), Max: 36.7 (29 Jul 2024 12:47)  T(F): 98.1 (29 Jul 2024 12:47), Max: 98.1 (29 Jul 2024 12:47)  HR: 80 (29 Jul 2024 13:02) (74 - 86)  BP: 116/69 (29 Jul 2024 13:02) (116/69 - 146/81)  BP(mean): 86 (29 Jul 2024 13:02) (86 - 92)  RR: 17 (29 Jul 2024 13:02) (16 - 17)  SpO2: 100% (29 Jul 2024 13:02) (92% - 100%)    Parameters below as of 29 Jul 2024 12:52  Patient On (Oxygen Delivery Method): mask, nonrebreather  O2 Flow (L/min): 15    Dressing C/D/I  Humphries cath in place, pale yellow urine output  Pulses: 2+ DP/PT B/L LES  SLT: sensation to light touch intact in L1-S2 distribution B/L LES  Motor: 5/5 EHL/FHL/TA/GS      Post op XR:    A/P: 73yoMale POD#0 s/p L2-pelvis laminectomy, posterior spinal fusion, transforaminal lumbar interbody on 7/29 by Dr. Daly  - Stable  - Pain Control  - DVT ppx: SCDs  - Post op abx: Ancef   - PT, WBS: NWB  - F/U AM Labs Orthopedics Post Op Check    Procedure: L2-pelvis laminectomy, posterior spinal fusion, transforaminal lumbar interbody on 7/29  Surgeon: Dr. Daly    Pt laying comfortable in bed on 2L oxygen per anesthesia. Pt has no acute complaints.   Denies CP, SOB, N/V, numbness/tingling in b/l LE.     Vital Signs Last 24 Hrs  T(C): 36.7 (29 Jul 2024 12:47), Max: 36.7 (29 Jul 2024 12:47)  T(F): 98.1 (29 Jul 2024 12:47), Max: 98.1 (29 Jul 2024 12:47)  HR: 80 (29 Jul 2024 13:02) (74 - 86)  BP: 116/69 (29 Jul 2024 13:02) (116/69 - 146/81)  BP(mean): 86 (29 Jul 2024 13:02) (86 - 92)  RR: 17 (29 Jul 2024 13:02) (16 - 17)  SpO2: 100% (29 Jul 2024 13:02) (92% - 100%)    Parameters below as of 29 Jul 2024 12:52  Patient On (Oxygen Delivery Method): mask, nonrebreather  O2 Flow (L/min): 15    Dressing C/D/I with drain intact with serosanguinous output.  Humphries cath in place, pale yellow urine output  Pulses: 2+ DP/PT B/L LES  SLT: sensation to light touch intact in L1-S2 distribution B/L LES  Motor: 5/5 EHL/FHL/TA/GS      Post op XR:    A/P: 73yoMale POD#0 s/p L2-pelvis laminectomy, posterior spinal fusion, transforaminal lumbar interbody on 7/29 by Dr. Daly  - Stable  - Pain Control  - DVT ppx: SCDs  - Post op abx: Ancef   - PT, WBS: NWB  - F/U AM Labs Orthopedics Post Op Check    Procedure: L2-pelvis laminectomy, posterior spinal fusion, transforaminal lumbar interbody on 7/29  Surgeon: Dr. Daly    Pt laying comfortable in bed on 2L oxygen per anesthesia. Pt has no acute complaints.   Denies CP, SOB, N/V, numbness/tingling in b/l LE.     Vital Signs Last 24 Hrs  T(C): 36.7 (29 Jul 2024 12:47), Max: 36.7 (29 Jul 2024 12:47)  T(F): 98.1 (29 Jul 2024 12:47), Max: 98.1 (29 Jul 2024 12:47)  HR: 80 (29 Jul 2024 13:02) (74 - 86)  BP: 116/69 (29 Jul 2024 13:02) (116/69 - 146/81)  BP(mean): 86 (29 Jul 2024 13:02) (86 - 92)  RR: 17 (29 Jul 2024 13:02) (16 - 17)  SpO2: 100% (29 Jul 2024 13:02) (92% - 100%)    Parameters below as of 29 Jul 2024 12:52  Patient On (Oxygen Delivery Method): mask, nonrebreather  O2 Flow (L/min): 15    Dressing C/D/I with drain intact with serosanguinous output.  Humphries cath in place, pale yellow urine output  Pulses: 2+ DP/PT B/L LES  SLT: sensation to light touch intact in L1-S2 distribution B/L LES  Motor: 5/5 EHL/FHL/TA/GS      Post op XR:    A/P: 73yoMale POD#0 s/p L2-pelvis laminectomy, posterior spinal fusion, transforaminal lumbar interbody on 7/29 by Dr. Daly  - Stable  - Pain Control  - DVT ppx: SCDs  - Post op abx: Ancef   - PT, WBS: WBAT  - F/U AM Labs

## 2024-07-29 NOTE — PHYSICAL THERAPY INITIAL EVALUATION ADULT - PERTINENT HX OF CURRENT PROBLEM, REHAB EVAL
73y M with c/o severe back pain x 1 year. He reports that the pain began spontaneously and without accident or trauma. Pt says that the pain persists and/or is worsening despite conservative measures of physical therapy and increasingly unresponsive to use of medications. Pt ambulates without the use of a cane or walker for assistance at home. Denies numbness/tingling of lower extremities. He is unable to ambulate more than 2 blocks being limited by pain.     Denies history of blood clots or seizures. Denies chest pain, shortness of breath, nausea or vomiting today.     Presents for elective L2-pelvis laminectomy, posterior spinal fusion, transforaminal lumbar interbody fusion with Dr. Daly.

## 2024-07-29 NOTE — PRE-ANESTHESIA EVALUATION ADULT - NSANTHAIRWAYFT_ENT_ALL_CORE
Inter-incisor distance: > 3cm  HMD: > 6cm  Submandibular space: normal compliance  Neck:  normal length and thickness  Head/Neck:  FROM  Beard

## 2024-07-29 NOTE — BRIEF OPERATIVE NOTE - NSICDXBRIEFPREOP_GEN_ALL_CORE_FT
PRE-OP DIAGNOSIS:  Spondylolisthesis, lumbar region 29-Jul-2024 12:14:12  Gino Osuna  Lumbar spinal stenosis 29-Jul-2024 12:14:21  Gino Osuna  Degenerative scoliosis 29-Jul-2024 12:14:32  Gino Osuna

## 2024-07-29 NOTE — PRE-ANESTHESIA EVALUATION ADULT - NSANTHSUBSTSD_GEN_ALL_CORE
Impression: Type 2 diabetes mellitus without complications: A04.9. Plan: No Diabetic Retinopathy, no Diabetic Macular Edema and no Neovascularization of the iris, disc, or elsewhere. Discussed ocular and systemic benefits of blood sugar control. Check annually. Last A1C 6.8___. No

## 2024-07-29 NOTE — BRIEF OPERATIVE NOTE - NSICDXBRIEFPROCEDURE_GEN_ALL_CORE_FT
PROCEDURES:  Fusion, spine, lumbar, TLIF, 1-2 levels 29-Jul-2024 12:12:39  Gino Osuna  Fusion, spine, 6 levels, posterior approach 29-Jul-2024 12:13:25  Gino Osuna  Laminectomy, spine, lumbar, 2 levels 29-Jul-2024 12:13:42  Gino Osuna

## 2024-07-29 NOTE — PHYSICAL THERAPY INITIAL EVALUATION ADULT - ADDITIONAL COMMENTS
Pt lives in an private home with 1 JAMAAL. Pt reports to be indpt with all ADLs and IADLs prior to sx. Pt reports to ambulate with SC at times and owns a RW.

## 2024-07-30 ENCOUNTER — TRANSCRIPTION ENCOUNTER (OUTPATIENT)
Age: 74
End: 2024-07-30

## 2024-07-30 LAB
ANION GAP SERPL CALC-SCNC: 7 MMOL/L — SIGNIFICANT CHANGE UP (ref 5–17)
ANISOCYTOSIS BLD QL: SLIGHT — SIGNIFICANT CHANGE UP
BASOPHILS # BLD AUTO: 0 K/UL — SIGNIFICANT CHANGE UP (ref 0–0.2)
BASOPHILS NFR BLD AUTO: 0 % — SIGNIFICANT CHANGE UP (ref 0–2)
BUN SERPL-MCNC: 22 MG/DL — SIGNIFICANT CHANGE UP (ref 7–23)
CALCIUM SERPL-MCNC: 8.5 MG/DL — SIGNIFICANT CHANGE UP (ref 8.4–10.5)
CHLORIDE SERPL-SCNC: 105 MMOL/L — SIGNIFICANT CHANGE UP (ref 96–108)
CO2 SERPL-SCNC: 25 MMOL/L — SIGNIFICANT CHANGE UP (ref 22–31)
CREAT SERPL-MCNC: 1.39 MG/DL — HIGH (ref 0.5–1.3)
EGFR: 54 ML/MIN/1.73M2 — LOW
EOSINOPHIL # BLD AUTO: 0 K/UL — SIGNIFICANT CHANGE UP (ref 0–0.5)
EOSINOPHIL NFR BLD AUTO: 0 % — SIGNIFICANT CHANGE UP (ref 0–6)
GIANT PLATELETS BLD QL SMEAR: PRESENT — SIGNIFICANT CHANGE UP
GLUCOSE SERPL-MCNC: 135 MG/DL — HIGH (ref 70–99)
HCT VFR BLD CALC: 34.9 % — LOW (ref 39–50)
HGB BLD-MCNC: 11.8 G/DL — LOW (ref 13–17)
LYMPHOCYTES # BLD AUTO: 0.84 K/UL — LOW (ref 1–3.3)
LYMPHOCYTES # BLD AUTO: 4.4 % — LOW (ref 13–44)
MANUAL SMEAR VERIFICATION: SIGNIFICANT CHANGE UP
MCHC RBC-ENTMCNC: 31.1 PG — SIGNIFICANT CHANGE UP (ref 27–34)
MCHC RBC-ENTMCNC: 33.8 GM/DL — SIGNIFICANT CHANGE UP (ref 32–36)
MCV RBC AUTO: 92.1 FL — SIGNIFICANT CHANGE UP (ref 80–100)
MONOCYTES # BLD AUTO: 1.65 K/UL — HIGH (ref 0–0.9)
MONOCYTES NFR BLD AUTO: 8.7 % — SIGNIFICANT CHANGE UP (ref 2–14)
NEUTROPHILS # BLD AUTO: 16.53 K/UL — HIGH (ref 1.8–7.4)
NEUTROPHILS NFR BLD AUTO: 85.2 % — HIGH (ref 43–77)
NEUTS BAND # BLD: 1.7 % — SIGNIFICANT CHANGE UP (ref 0–8)
OVALOCYTES BLD QL SMEAR: SLIGHT — SIGNIFICANT CHANGE UP
PLAT MORPH BLD: ABNORMAL
PLATELET # BLD AUTO: 219 K/UL — SIGNIFICANT CHANGE UP (ref 150–400)
POTASSIUM SERPL-MCNC: 4.8 MMOL/L — SIGNIFICANT CHANGE UP (ref 3.5–5.3)
POTASSIUM SERPL-SCNC: 4.8 MMOL/L — SIGNIFICANT CHANGE UP (ref 3.5–5.3)
RBC # BLD: 3.79 M/UL — LOW (ref 4.2–5.8)
RBC # FLD: 13 % — SIGNIFICANT CHANGE UP (ref 10.3–14.5)
RBC BLD AUTO: ABNORMAL
SODIUM SERPL-SCNC: 137 MMOL/L — SIGNIFICANT CHANGE UP (ref 135–145)
WBC # BLD: 19.02 K/UL — HIGH (ref 3.8–10.5)
WBC # FLD AUTO: 19.02 K/UL — HIGH (ref 3.8–10.5)

## 2024-07-30 PROCEDURE — 99222 1ST HOSP IP/OBS MODERATE 55: CPT

## 2024-07-30 RX ORDER — ASPIRIN 325 MG
10 TABLET ORAL DAILY
Refills: 0 | Status: DISCONTINUED | OUTPATIENT
Start: 2024-07-30 | End: 2024-08-02

## 2024-07-30 RX ORDER — ACETAMINOPHEN 500 MG
2 TABLET ORAL
Qty: 0 | Refills: 0 | DISCHARGE
Start: 2024-07-30

## 2024-07-30 RX ADMIN — Medication 1000 MILLIGRAM(S): at 21:49

## 2024-07-30 RX ADMIN — Medication 100 MILLIGRAM(S): at 00:41

## 2024-07-30 RX ADMIN — Medication 500 MILLIGRAM(S): at 22:58

## 2024-07-30 RX ADMIN — SENNOSIDES 2 TABLET(S): 8.6 TABLET ORAL at 21:49

## 2024-07-30 RX ADMIN — Medication 1 MILLIGRAM(S): at 21:49

## 2024-07-30 RX ADMIN — Medication 1000 MILLIGRAM(S): at 13:30

## 2024-07-30 RX ADMIN — OXYCODONE HYDROCHLORIDE 10 MILLIGRAM(S): 30 TABLET ORAL at 22:49

## 2024-07-30 RX ADMIN — Medication 500 MILLIGRAM(S): at 05:39

## 2024-07-30 RX ADMIN — OXYCODONE HYDROCHLORIDE 10 MILLIGRAM(S): 30 TABLET ORAL at 15:20

## 2024-07-30 RX ADMIN — Medication 1000 MILLIGRAM(S): at 05:39

## 2024-07-30 RX ADMIN — Medication 17 GRAM(S): at 13:31

## 2024-07-30 RX ADMIN — OXYCODONE HYDROCHLORIDE 10 MILLIGRAM(S): 30 TABLET ORAL at 06:39

## 2024-07-30 RX ADMIN — Medication 50 MILLIGRAM(S): at 22:57

## 2024-07-30 RX ADMIN — OXYCODONE HYDROCHLORIDE 10 MILLIGRAM(S): 30 TABLET ORAL at 05:39

## 2024-07-30 RX ADMIN — Medication 50 MILLIGRAM(S): at 13:30

## 2024-07-30 RX ADMIN — OXYCODONE HYDROCHLORIDE 10 MILLIGRAM(S): 30 TABLET ORAL at 21:49

## 2024-07-30 RX ADMIN — OXYCODONE HYDROCHLORIDE 10 MILLIGRAM(S): 30 TABLET ORAL at 14:43

## 2024-07-30 RX ADMIN — PANTOPRAZOLE SODIUM 40 MILLIGRAM(S): 20 TABLET, DELAYED RELEASE ORAL at 05:39

## 2024-07-30 RX ADMIN — Medication 500 MILLIGRAM(S): at 13:30

## 2024-07-30 NOTE — DISCHARGE NOTE PROVIDER - HOSPITAL COURSE
TB are you willing to prescribe or should pt get otc? Admitted 7/29/24  Surgery- L2-pelvis PSF, L3-L5 laminectomy  Huyen-op Antibiotics  Pain control  DVT prophylaxis- SCDs, LVX 40mg subq daily started PO#2  OOB/Physical Therapy/ Occupational Therapy    Inpatient Events:  Humphries removed post-op day 1  HV removed when output appropriately low   Admitted 7/29/24  Surgery- L2-pelvis PSF, L3-L5 laminectomy  Huyen-op Antibiotics  Pain control  DVT prophylaxis- SCDs, LVX 40mg subq daily started PO#2  OOB/Physical Therapy/ Occupational Therapy    Inpatient Events:  Humphries removed post-op day 1, voiding  HV removed when output appropriately low

## 2024-07-30 NOTE — DISCHARGE NOTE PROVIDER - CARE PROVIDER_API CALL
Gino Daly  Orthopaedic Surgery  [address]  [place] [address]  Phone: [phone]  Fax: [phone]  Follow Up Time: 2 weeks

## 2024-07-30 NOTE — OCCUPATIONAL THERAPY INITIAL EVALUATION ADULT - MD ORDER
Severe back pain   Spondylolisthesis of lumbar region  S/P L2-pelvis laminectomy, posterior spinal fusion, transforaminal lumbar interbody on 7/29

## 2024-07-30 NOTE — DISCHARGE NOTE PROVIDER - NSDCMRMEDTOKEN_GEN_ALL_CORE_FT
acetaminophen 500 mg oral tablet: 2 tab(s) orally every 8 hours  Dexilant 30 mg oral delayed release capsule: 1 cap(s) orally once a day  famotidine 40 mg oral tablet: 1 tab(s) orally once a day (at bedtime)  Xanax 1 mg oral tablet: 1 milligram(s) orally once a day (at bedtime)   acetaminophen 500 mg oral tablet: 2 tab(s) orally every 8 hours  Dexilant 30 mg oral delayed release capsule: 1 cap(s) orally once a day  famotidine 40 mg oral tablet: 1 tab(s) orally once a day (at bedtime)  methocarbamol 500 mg oral tablet: 1 tab(s) orally every 8 hours as needed for  muscle spasm MDD: 3  Narcan 4 mg/0.1 mL nasal spray: 1 spray(s) intranasally once as needed for oversedation . Use according to instructions on box.  oxyCODONE 5 mg oral tablet: 1 tab(s) orally every 4 hours as needed for  severe pain MDD: 6  pregabalin 50 mg oral capsule: 1 cap(s) orally 3 times a day MDD: 3  Xanax 1 mg oral tablet: 1 milligram(s) orally once a day (at bedtime)

## 2024-07-30 NOTE — DISCHARGE NOTE PROVIDER - NSDCCPCAREPLAN_GEN_ALL_CORE_FT
PRINCIPAL DISCHARGE DIAGNOSIS  Diagnosis: History of back pain  Assessment and Plan of Treatment: L2-5 PSF, L2-3 laminectomy

## 2024-07-30 NOTE — OCCUPATIONAL THERAPY INITIAL EVALUATION ADULT - PLANNED THERAPY INTERVENTIONS, OT EVAL
ADL retraining/IADL retraining/balance training/cognitive, visual perceptual/fine motor coordination training/motor coordination training/neuromuscular re-education/parent/caregiver training.../ROM/strengthening/transfer training

## 2024-07-30 NOTE — CONSULT NOTE ADULT - ASSESSMENT
73M w CKD3 (Cr 1.49) obesity, p/w chronic low back pain x1y, here for elective L2-Pelvis PSF, L3-5 Lami 7/29 w Dr. Daly    #Post-op state - pain controlled. PPx; SCDs per ortho. On bowel regimen and incentive spirometer  #Lumbar stenosis   - Tylenol 1g q8, Robaxin 500 q8, lyrica 50 q8   - PRN: Oxycodone 5/10 q4 for mod/severe pain    #Leukocytosis - likely reactive. Pt afebrile and non-toxic appearing  #Acute blood loss anemia - hgb 11.8. From baseline 12.3. Asymptomatic  #CKD3 - 1.2 from baseline 1.49. Caution w NSAIDs  #Obesity - BMI 31.9. Affects all aspects of care    Plan  Caution w NSAIDs  Continue PT    DISPO: Home no needs pending drain output  Above d/w ortho

## 2024-07-30 NOTE — DISCHARGE NOTE PROVIDER - NSDCCPTREATMENT_GEN_ALL_CORE_FT
PRINCIPAL PROCEDURE  Procedure: Fusion, spine, lumbar, TLIF, 1-2 levels  Findings and Treatment: back pain

## 2024-07-30 NOTE — PROGRESS NOTE ADULT - SUBJECTIVE AND OBJECTIVE BOX
Ortho Note    Pt seen and examined. Comfortable without complaints, pain controlled  Denies CP, SOB, N/V, numbness/tingling     Vital Signs Last 24 Hrs  T(C): 36.7 (07-30-24 @ 08:38), Max: 36.7 (07-30-24 @ 08:38)  T(F): 98.1 (07-30-24 @ 08:38), Max: 98.1 (07-30-24 @ 08:38)  HR: 88 (07-30-24 @ 08:38) (79 - 88)  BP: 119/67 (07-30-24 @ 08:38) (94/60 - 119/67)  BP(mean): --  RR: 16 (07-30-24 @ 08:38) (16 - 17)  SpO2: 93% (07-30-24 @ 08:38) (92% - 93%)  AVSS    General: Pt Alert and oriented, NAD  DSG- gauze/ paper tape C/D/I  Pulses: +2DP, WWP feet  Sensation: SILT BLE  Motor: 5/5 EHL/FHL/TA/GS BLE                          11.8   19.02 )-----------( 219      ( 30 Jul 2024 05:30 )             34.9     07-30    137  |  105  |  22  ----------------------------<  135<H>  4.8   |  25  |  1.39<H>    Ca    8.5      30 Jul 2024 05:30        A/P: 73yMale POD#1 s/p L2-pelvis PSF, L3-5 laminectomy  - VSS, Labs WNL- appreciate internal medicine recommendations  - Pain Control  - DVT ppx: SCDs, LVX 40mg subq daily to start PO #2  - PT, WBS: WBAT, OT consult  - OOB for meals, I/S  - bowel regimen  - remove reynolds for TOV today  - monitor HV x 1 output  - dispo: home pending HV removal and PT clearance    Ortho Pager [phone]

## 2024-07-30 NOTE — DISCHARGE NOTE PROVIDER - NSDCFUADDINST_GEN_ALL_CORE_FT
ACTIVITY:     - No extreme bending, extending, turning, twisting, or straining. No strenuous activity, heavy lifting, driving or returning to work until cleared by your surgeon.           DRESSING/SHOWERING:      (PRINEO – mesh with glue)     -May shower post-op day 1, pat dry afterwards. Dressing is water-resistant. Do not soak in bathtubs. Do not need to cover with another dressing. Do not remove mesh dressing – it will be taken care of in your follow up appointment. Do not apply ointments, creams or oils to incision area.     (STAPLES/SUTURES/STERI-STRIPS)     -Change dressing daily until post-op day 5. Sponge bathe until post-op day 5 then may take full shower. Once dressing removed keep incision clean and dry. Do not pick at your incision. Do not apply creams, ointments or oils to your incision until cleared by your surgeon. Do not soak your incision in sitting water (ie tubs, pools, lakes, etc.) until cleared by your surgeon.            MEDICATION/ANTICOAGULATION:     - You have been prescribed medications for pain:       - Tylenol (Acetaminophen) for mild to moderate pain. Do not exceed 3,000mg daily.       - For more severe pain, you may continue to take the Tylenol with the addition of narcotic pain medication. Take this medication as prescribed. Do not take more than prescribed. Note that this medication may cause drowsiness or dizziness. Do not operate machinery. This medication may cause constipation.     - If you have been prescribed a muscle relaxer, take this medication as needed for muscle spasm. Follow instructions on bottle.     - Try to have regular bowel movements. Take stool softener or laxative if necessary. You may wish to take Miralax daily until you have regular bowel movements.      - Do not take antiinflammatories (Aleve, Advil, Naproxen, Ibuprofen, etc.) until cleared by your surgeon. Tylenol is not an anti-inflammatory and okay to take (see above).     - If you have a pain management physician, please follow-up with them postoperatively.      - If you experience any negative side effects of your medications, please call your surgeon's office to discuss.           FOLLOW UP:     - Call to schedule an appt with Dr. Daly for follow up.      - Please follow-up with your primary care physician or any other specialist you see postoperatively, if needed.      - Contact your doctor or go to the emergency room if you experience: fever greater than 101.5, chills, chest pain, difficulty breathing, redness or excessive drainage around the incision, other concerns.   ACTIVITY:     - No extreme bending, extending, turning, twisting, or straining. No strenuous activity, heavy lifting, driving or returning to work until cleared by your surgeon.           (STAPLES/SUTURES/STERI-STRIPS)     - You may shower. Change gauze dressing daily until post-op day 5. If you have steri-strips under your gauze dressing, do not remove them. They will fall off on their own in about 2 weeks. Once dressing removed keep incision clean and dry. Do not pick at your incision. Do not apply creams, ointments or oils to your incision until cleared by your surgeon. Do not soak your incision in sitting water (ie tubs, pools, lakes, etc.) until cleared by your surgeon.            MEDICATION/ANTICOAGULATION:     - You have been prescribed medications for pain:       - Tylenol (Acetaminophen) for mild to moderate pain. Do not exceed 3,000mg daily.       - For more severe pain, you may continue to take the Tylenol with the addition of narcotic pain medication. Take this medication as prescribed. Do not take more than prescribed. Note that this medication may cause drowsiness or dizziness. Do not operate machinery. This medication may cause constipation.     - If you have been prescribed a muscle relaxer, take this medication as needed for muscle spasm. Follow instructions on bottle.     - Try to have regular bowel movements. Take stool softener or laxative if necessary. You may wish to take Miralax daily until you have regular bowel movements.      - Do not take antiinflammatories (Aleve, Advil, Naproxen, Ibuprofen, etc.) until cleared by your surgeon. Tylenol is not an anti-inflammatory and okay to take (see above).     - If you have a pain management physician, please follow-up with them postoperatively.      - If you experience any negative side effects of your medications, please call your surgeon's office to discuss.           FOLLOW UP:     - Call to schedule an appt with Dr. Daly for follow up.      - Please follow-up with your primary care physician or any other specialist you see postoperatively, if needed.      - Contact your doctor or go to the emergency room if you experience: fever greater than 101.5, chills, chest pain, difficulty breathing, redness or excessive drainage around the incision, other concerns.   ACTIVITY:     - No extreme bending, extending, turning, twisting, or straining. No strenuous activity, heavy lifting, driving or returning to work until cleared by your surgeon.           (STAPLES/SUTURES/STERI-STRIPS)     - You have an aquacel dressing over steri- strips. This dressing is waterproof, you may shower. Keep dressing on for 7 days. Then you may remove and leave open to air. DO NOT REMOVE THE STERI STRIPS ON YOUR INCISION. They will fall off on their own in about 2 weeks. Once dressing removed keep incision clean and dry. Do not pick at your incision. Do not apply creams, ointments or oils to your incision until cleared by your surgeon. Do not soak your incision in sitting water (ie tubs, pools, lakes, etc.) until cleared by your surgeon.            MEDICATION/ANTICOAGULATION:     - You have been prescribed medications for pain:       - Tylenol (Acetaminophen) for mild to moderate pain. Do not exceed 3,000mg daily.       - For more severe pain, you may continue to take the Tylenol with the addition of narcotic pain medication. Take this medication as prescribed. Do not take more than prescribed. Note that this medication may cause drowsiness or dizziness. Do not operate machinery. This medication may cause constipation.     - If you have been prescribed a muscle relaxer, take this medication as needed for muscle spasm. Follow instructions on bottle.     - Try to have regular bowel movements. Take stool softener or laxative if necessary. You may wish to take Miralax daily until you have regular bowel movements.      - Do not take antiinflammatories (Aleve, Advil, Naproxen, Ibuprofen, etc.) until cleared by your surgeon. Tylenol is not an anti-inflammatory and okay to take (see above).     - If you have a pain management physician, please follow-up with them postoperatively.      - If you experience any negative side effects of your medications, please call your surgeon's office to discuss.           FOLLOW UP:     - Call to schedule an appt with Dr. Daly for follow up.      - Please follow-up with your primary care physician or any other specialist you see postoperatively, if needed.      - Contact your doctor or go to the emergency room if you experience: fever greater than 101.5, chills, chest pain, difficulty breathing, redness or excessive drainage around the incision, other concerns.

## 2024-07-30 NOTE — OCCUPATIONAL THERAPY INITIAL EVALUATION ADULT - PERTINENT HX OF CURRENT PROBLEM, REHAB EVAL
73y M with c/o severe back pain x 1 year. He reports that the pain began spontaneously and without accident or trauma. Pt says that the pain persists and/or is worsening despite conservative measures of physical therapy and increasingly unresponsive to use of medications. Pt ambulates without the use of a cane or walker for assistance at home. He is unable to ambulate more than 2 blocks being limited by pain.

## 2024-07-30 NOTE — DISCHARGE NOTE PROVIDER - NSDCFUSCHEDAPPT_GEN_ALL_CORE_FT
[facility]  ORTHOSURG [address]  Scheduled Appointment: 08/08/2024     [facility]  ORTHOSURG [address]  Scheduled Appointment: 08/15/2024

## 2024-07-30 NOTE — CONSULT NOTE ADULT - SUBJECTIVE AND OBJECTIVE BOX
HPI "  73y M with c/o severe back pain x 1 year. He reports that the pain began spontaneously and without accident or trauma. Pt says that the pain persists and/or is worsening despite conservative measures of physical therapy and increasingly unresponsive to use of medications. Pt ambulates without the use of a cane or walker for assistance at home. Denies numbness/tingling of lower extremities. He is unable to ambulate more than 2 blocks being limited by pain.     Denies history of blood clots or seizures. Denies chest pain, shortness of breath, nausea or vomiting today.     Presents for elective L2-pelvis laminectomy, posterior spinal fusion, transforaminal lumbar interbody fusion with Dr. Daly. (26 Jul 2024 12:17)    73M w CKD3 (Cr 1.49) obesity, p/w chronic low back pain x1y, here for elective L2-Pelvis PSF, L3-5 Lami 7/29 w Dr. Daly    Pt reports progressive Back pain not improved w conservative management. Today he is seated in a chair - states pain was controlled when walking w PT - no LH/dizziness, chest pain, dyspnea. Does have some back pain exacerbated by L hip flexion. No fever, dysuria. +flatus wo BM. eating wo N/V/abd pain. Eager to mobilize more    ROS: 12 point ROS reviewed and otherwise negative per HPI  FH: No DVT/PE   SH: Non smoker      PAST MEDICAL & SURGICAL HISTORY:  Left shoulder pain  resolved post surgery      Hypertension  not on meds      Gastric ulcer      History of surgery  L shoulder replacent 2002      History of torn meniscus of left knee      History of endoscopy      History of colonoscopy      H/O total knee replacement, left        Home Meds: Home Medications:  Dexilant 30 mg oral delayed release capsule: 1 cap(s) orally once a day (29 Jul 2024 09:03)  famotidine 40 mg oral tablet: 1 tab(s) orally once a day (at bedtime) (29 Jul 2024 09:03)  Xanax 1 mg oral tablet: 1 milligram(s) orally once a day (at bedtime) (29 Jul 2024 09:03)    Allergies: Allergies    No Known Allergies    Intolerances      Soc:   Advanced Directives: Presumed Full Code     CURRENT MEDICATIONS:   --------------------------------------------------------------------------------------  Neurologic Medications  acetaminophen     Tablet .. 1000 milliGRAM(s) Oral every 8 hours  ALPRAZolam 1 milliGRAM(s) Oral at bedtime  HYDROmorphone  Injectable 0.5 milliGRAM(s) IV Push every 6 hours PRN Breakthrough pain  methocarbamol 500 milliGRAM(s) Oral every 8 hours  oxyCODONE    IR 10 milliGRAM(s) Oral every 4 hours PRN Severe Pain (7 - 10)  oxyCODONE    IR 5 milliGRAM(s) Oral every 4 hours PRN Moderate Pain (4 - 6)  pregabalin 50 milliGRAM(s) Oral three times a day    Respiratory Medications    Cardiovascular Medications    Gastrointestinal Medications  bisacodyl Suppository 10 milliGRAM(s) Rectal daily PRN Constipation  lactated ringers. 1000 milliLiter(s) IV Continuous <Continuous>  magnesium hydroxide Suspension 30 milliLiter(s) Oral every 12 hours PRN Constipation  pantoprazole    Tablet 40 milliGRAM(s) Oral before breakfast  polyethylene glycol 3350 17 Gram(s) Oral daily  senna 2 Tablet(s) Oral at bedtime    Genitourinary Medications    Hematologic/Oncologic Medications    Antimicrobial/Immunologic Medications    Endocrine/Metabolic Medications    Topical/Other Medications  benzocaine/menthol Lozenge 1 Lozenge Oral daily    --------------------------------------------------------------------------------------    VITAL SIGNS, INS/OUTS (last 24 hours):  --------------------------------------------------------------------------------------  ICU Vital Signs Last 24 Hrs  T(C): 36.7 (30 Jul 2024 08:38), Max: 36.8 (29 Jul 2024 16:26)  T(F): 98.1 (30 Jul 2024 08:38), Max: 98.3 (29 Jul 2024 20:57)  HR: 88 (30 Jul 2024 08:38) (79 - 100)  BP: 119/67 (30 Jul 2024 08:38) (94/60 - 137/82)  BP(mean): 90 (29 Jul 2024 16:26) (81 - 102)  ABP: 112/62 (29 Jul 2024 14:12) (112/62 - 136/66)  ABP(mean): 78 (29 Jul 2024 14:12) (78 - 90)  RR: 16 (30 Jul 2024 08:38) (11 - 17)  SpO2: 93% (30 Jul 2024 08:38) (92% - 100%)    O2 Parameters below as of 30 Jul 2024 08:38  Patient On (Oxygen Delivery Method): room air          I&O's Summary    29 Jul 2024 07:01  -  30 Jul 2024 07:00  --------------------------------------------------------  IN: 1350 mL / OUT: 3415 mL / NET: -2065 mL      --------------------------------------------------------------------------------------    EXAM:  GEN: Male in NAD on RA  HEENT: NC/AT, MMM  CV: RRR, nml S1S2, no murmurs  PULM: nml effort, CTAB  ABD: Soft, non-distended, NABS, non-tender  NEURO  A/O x3, moving all extremities, Lumbar dressing c/d/i. accordion drain in place. Sensation intact  5/5 in b/l plantarflex/ext, hip flex/ext.   PSYCH: Appropriate      LABS  --------------------------------------------------------------------------------------  Labs:  CAPILLARY BLOOD GLUCOSE                              11.8   19.02 )-----------( 219      ( 30 Jul 2024 05:30 )             34.9       Auto Neutrophil %: 85.2 % (07-30-24 @ 05:30)  Band Neutrophils %: 1.7 % (07-30-24 @ 05:30)    07-30    137  |  105  |  22  ----------------------------<  135<H>  4.8   |  25  |  1.39<H>      Calcium: 8.5 mg/dL (07-30-24 @ 05:30)      LFTs:         Coags:            Urinalysis Basic - ( 30 Jul 2024 05:30 )    Color: x / Appearance: x / SG: x / pH: x  Gluc: 135 mg/dL / Ketone: x  / Bili: x / Urobili: x   Blood: x / Protein: x / Nitrite: x   Leuk Esterase: x / RBC: x / WBC x   Sq Epi: x / Non Sq Epi: x / Bacteria: x          --------------------------------------------------------------------------------------    OTHER LABS    IMAGING RESULTS  ****************

## 2024-07-30 NOTE — OCCUPATIONAL THERAPY INITIAL EVALUATION ADULT - ADDITIONAL COMMENTS
Pt states that he lives w/ his family in private house w/ x 3 stairs to enter. Pt states that he was independent prior to admission and w/o prior use of AEs.

## 2024-07-30 NOTE — OCCUPATIONAL THERAPY INITIAL EVALUATION ADULT - GENERAL OBSERVATIONS, REHAB EVAL
Pt's RN Dionne aware of intent to eval/tx; cleared Pt. Pt received in chair post his PT session - +heplock, incisional dressing w/ hemovac drain intact. Pt fair in affect, agreeable to OT.

## 2024-07-31 LAB
ANION GAP SERPL CALC-SCNC: 7 MMOL/L — SIGNIFICANT CHANGE UP (ref 5–17)
BASOPHILS # BLD AUTO: 0.05 K/UL — SIGNIFICANT CHANGE UP (ref 0–0.2)
BASOPHILS NFR BLD AUTO: 0.3 % — SIGNIFICANT CHANGE UP (ref 0–2)
BUN SERPL-MCNC: 21 MG/DL — SIGNIFICANT CHANGE UP (ref 7–23)
CALCIUM SERPL-MCNC: 8 MG/DL — LOW (ref 8.4–10.5)
CHLORIDE SERPL-SCNC: 104 MMOL/L — SIGNIFICANT CHANGE UP (ref 96–108)
CO2 SERPL-SCNC: 25 MMOL/L — SIGNIFICANT CHANGE UP (ref 22–31)
CREAT SERPL-MCNC: 1.16 MG/DL — SIGNIFICANT CHANGE UP (ref 0.5–1.3)
EGFR: 67 ML/MIN/1.73M2 — SIGNIFICANT CHANGE UP
EOSINOPHIL # BLD AUTO: 0.39 K/UL — SIGNIFICANT CHANGE UP (ref 0–0.5)
EOSINOPHIL NFR BLD AUTO: 2.6 % — SIGNIFICANT CHANGE UP (ref 0–6)
GLUCOSE SERPL-MCNC: 130 MG/DL — HIGH (ref 70–99)
HCT VFR BLD CALC: 32.5 % — LOW (ref 39–50)
HGB BLD-MCNC: 10.9 G/DL — LOW (ref 13–17)
IMM GRANULOCYTES NFR BLD AUTO: 0.7 % — SIGNIFICANT CHANGE UP (ref 0–0.9)
LYMPHOCYTES # BLD AUTO: 15.7 % — SIGNIFICANT CHANGE UP (ref 13–44)
LYMPHOCYTES # BLD AUTO: 2.35 K/UL — SIGNIFICANT CHANGE UP (ref 1–3.3)
MCHC RBC-ENTMCNC: 31.1 PG — SIGNIFICANT CHANGE UP (ref 27–34)
MCHC RBC-ENTMCNC: 33.5 GM/DL — SIGNIFICANT CHANGE UP (ref 32–36)
MCV RBC AUTO: 92.9 FL — SIGNIFICANT CHANGE UP (ref 80–100)
MONOCYTES # BLD AUTO: 2.14 K/UL — HIGH (ref 0–0.9)
MONOCYTES NFR BLD AUTO: 14.3 % — HIGH (ref 2–14)
NEUTROPHILS # BLD AUTO: 9.98 K/UL — HIGH (ref 1.8–7.4)
NEUTROPHILS NFR BLD AUTO: 66.4 % — SIGNIFICANT CHANGE UP (ref 43–77)
NRBC # BLD: 0 /100 WBCS — SIGNIFICANT CHANGE UP (ref 0–0)
PLATELET # BLD AUTO: 177 K/UL — SIGNIFICANT CHANGE UP (ref 150–400)
POTASSIUM SERPL-MCNC: 3.9 MMOL/L — SIGNIFICANT CHANGE UP (ref 3.5–5.3)
POTASSIUM SERPL-SCNC: 3.9 MMOL/L — SIGNIFICANT CHANGE UP (ref 3.5–5.3)
RBC # BLD: 3.5 M/UL — LOW (ref 4.2–5.8)
RBC # FLD: 13.7 % — SIGNIFICANT CHANGE UP (ref 10.3–14.5)
SODIUM SERPL-SCNC: 136 MMOL/L — SIGNIFICANT CHANGE UP (ref 135–145)
WBC # BLD: 15.01 K/UL — HIGH (ref 3.8–10.5)
WBC # FLD AUTO: 15.01 K/UL — HIGH (ref 3.8–10.5)

## 2024-07-31 PROCEDURE — 99233 SBSQ HOSP IP/OBS HIGH 50: CPT

## 2024-07-31 RX ORDER — NALOXONE HYDROCHLORIDE 0.4 MG/ML
1 INJECTION, SOLUTION INTRAMUSCULAR; INTRAVENOUS; SUBCUTANEOUS
Qty: 1 | Refills: 0
Start: 2024-07-31

## 2024-07-31 RX ORDER — OXYCODONE HYDROCHLORIDE 30 MG/1
1 TABLET ORAL
Qty: 30 | Refills: 0
Start: 2024-07-31 | End: 2024-08-04

## 2024-07-31 RX ORDER — ENOXAPARIN SODIUM 120 MG/.8ML
40 INJECTION SUBCUTANEOUS EVERY 24 HOURS
Refills: 0 | Status: DISCONTINUED | OUTPATIENT
Start: 2024-07-31 | End: 2024-08-02

## 2024-07-31 RX ORDER — METHOCARBAMOL 500 MG
1 TABLET ORAL
Qty: 21 | Refills: 0
Start: 2024-07-31 | End: 2024-08-06

## 2024-07-31 RX ADMIN — Medication 1 MILLIGRAM(S): at 21:53

## 2024-07-31 RX ADMIN — Medication 1000 MILLIGRAM(S): at 21:53

## 2024-07-31 RX ADMIN — OXYCODONE HYDROCHLORIDE 10 MILLIGRAM(S): 30 TABLET ORAL at 08:45

## 2024-07-31 RX ADMIN — Medication 1000 MILLIGRAM(S): at 05:32

## 2024-07-31 RX ADMIN — Medication 17 GRAM(S): at 13:01

## 2024-07-31 RX ADMIN — Medication 50 MILLIGRAM(S): at 13:01

## 2024-07-31 RX ADMIN — OXYCODONE HYDROCHLORIDE 10 MILLIGRAM(S): 30 TABLET ORAL at 23:49

## 2024-07-31 RX ADMIN — OXYCODONE HYDROCHLORIDE 10 MILLIGRAM(S): 30 TABLET ORAL at 19:10

## 2024-07-31 RX ADMIN — Medication 500 MILLIGRAM(S): at 21:53

## 2024-07-31 RX ADMIN — PANTOPRAZOLE SODIUM 40 MILLIGRAM(S): 20 TABLET, DELAYED RELEASE ORAL at 05:32

## 2024-07-31 RX ADMIN — SENNOSIDES 2 TABLET(S): 8.6 TABLET ORAL at 21:53

## 2024-07-31 RX ADMIN — Medication 1000 MILLIGRAM(S): at 13:01

## 2024-07-31 RX ADMIN — OXYCODONE HYDROCHLORIDE 10 MILLIGRAM(S): 30 TABLET ORAL at 08:01

## 2024-07-31 RX ADMIN — Medication 50 MILLIGRAM(S): at 05:32

## 2024-07-31 RX ADMIN — Medication 500 MILLIGRAM(S): at 05:32

## 2024-07-31 RX ADMIN — ENOXAPARIN SODIUM 40 MILLIGRAM(S): 120 INJECTION SUBCUTANEOUS at 13:02

## 2024-07-31 RX ADMIN — Medication 50 MILLIGRAM(S): at 21:53

## 2024-07-31 RX ADMIN — OXYCODONE HYDROCHLORIDE 10 MILLIGRAM(S): 30 TABLET ORAL at 18:34

## 2024-07-31 RX ADMIN — Medication 500 MILLIGRAM(S): at 13:01

## 2024-07-31 RX ADMIN — OXYCODONE HYDROCHLORIDE 10 MILLIGRAM(S): 30 TABLET ORAL at 22:49

## 2024-07-31 NOTE — PROGRESS NOTE ADULT - SUBJECTIVE AND OBJECTIVE BOX
Ortho Note    Pt comfortable without complaints, pain controlled  Denies CP, SOB, N/V, numbness/tingling     Vital Signs Last 24 Hrs  T(C): 36.7 (07-31-24 @ 05:07), Max: 36.7 (07-31-24 @ 05:07)  T(F): 98.1 (07-31-24 @ 05:07), Max: 98.1 (07-31-24 @ 05:07)  HR: 91 (07-31-24 @ 05:07) (91 - 91)  BP: 117/75 (07-31-24 @ 05:07) (117/75 - 117/75)  BP(mean): --  RR: 18 (07-31-24 @ 05:07) (18 - 18)  SpO2: 92% (07-31-24 @ 05:07) (92% - 92%)  I&O's Summary    29 Jul 2024 07:01  -  30 Jul 2024 07:00  --------------------------------------------------------  IN: 1350 mL / OUT: 3415 mL / NET: -2065 mL    30 Jul 2024 07:01  -  31 Jul 2024 06:28  --------------------------------------------------------  IN: 0 mL / OUT: 1590 mL / NET: -1590 mL        General: Pt Alert and oriented, NAD  DSG C/D/I  Pulses: 2+  Sensation: SILT  Motor: 5/5 Quad/Ham/EHL/FHL/TA/GS  HV 90/190                        11.8   19.02 )-----------( 219      ( 30 Jul 2024 05:30 )             34.9     07-30    137  |  105  |  22  ----------------------------<  135<H>  4.8   |  25  |  1.39<H>    Ca    8.5      30 Jul 2024 05:30        A/P: 73yMale POD#2 s/p L2-pelvis PSF, L3-5 lami  - Stable  - Pain Control  - DVT ppx: SCDs  - PT, WBS: WBAT  - NO skilled PT/OT needs  - Home pending drain removal  - lovenox tonight if stays    Ortho Pager [phone]

## 2024-07-31 NOTE — PROGRESS NOTE ADULT - SUBJECTIVE AND OBJECTIVE BOX
Ortho Note    Pt comfortable without complaints, pain controlled  Denies CP, SOB, N/V, numbness/tingling     Vital Signs Last 24 Hrs  T(C): 37.2 (07-31-24 @ 08:12), Max: 37.2 (07-31-24 @ 08:12)  T(F): 99 (07-31-24 @ 08:12), Max: 99 (07-31-24 @ 08:12)  HR: 94 (07-31-24 @ 08:12) (94 - 94)  BP: 114/75 (07-31-24 @ 08:12) (114/75 - 114/75)  BP(mean): --  RR: 16 (07-31-24 @ 08:12) (16 - 16)  SpO2: 94% (07-31-24 @ 08:12) (94% - 94%)  AVSS    General: Pt Alert and oriented, NAD  DSG- gauze/ paper tape C/D/I  Pulses: +2DP, WWP feet  Sensation: SILT BLE  Motor: 5/5 EHL/FHL/TA/GS BLE                            10.9   15.01 )-----------( 177      ( 31 Jul 2024 08:57 )             32.5     07-31    136  |  104  |  21  ----------------------------<  130<H>  3.9   |  25  |  1.16    Ca    8.0<L>      31 Jul 2024 08:57      A/P: 73yMale POD#2 s/p L2-pelvis PSF, L3-5 laminectomy  - VSS, Labs WNL- appreciate internal medicine recommendations  - Pain Control  - DVT ppx: SCDs, LVX 40mg subq daily  - PT, WBS: WBAT, OT consult- cleared PT today  - OOB for meals, I/S  - bowel regimen  - monitor HV x 1 output  - dispo: home pending HV removal    Ortho Pager [phone]

## 2024-07-31 NOTE — PROGRESS NOTE ADULT - SUBJECTIVE AND OBJECTIVE BOX
INTERVAL HPI/OVERNIGHT EVENTS: gerard o/n    SUBJECTIVE: Patient seen and examined at bedside.   Pt reports some increased pain in her back overnight. Improved this morning w PRN pain medications. States pain w L hip flexion is improved today. +flatus wo BM. Voiding wo dysuria. No fever, chest pain, dyspnea, LH/dizziness.    OBJECTIVE:    VITAL SIGNS:  ICU Vital Signs Last 24 Hrs  T(C): 37.2 (31 Jul 2024 08:12), Max: 37.2 (31 Jul 2024 08:12)  T(F): 99 (31 Jul 2024 08:12), Max: 99 (31 Jul 2024 08:12)  HR: 94 (31 Jul 2024 08:12) (91 - 99)  BP: 114/75 (31 Jul 2024 08:12) (112/70 - 122/74)  BP(mean): 90 (30 Jul 2024 20:49) (90 - 90)  ABP: --  ABP(mean): --  RR: 16 (31 Jul 2024 08:12) (16 - 18)  SpO2: 94% (31 Jul 2024 08:12) (92% - 97%)    O2 Parameters below as of 31 Jul 2024 08:12  Patient On (Oxygen Delivery Method): room air              07-30 @ 07:01  -  07-31 @ 07:00  --------------------------------------------------------  IN: 0 mL / OUT: 1590 mL / NET: -1590 mL    07-31 @ 07:01  -  07-31 @ 09:31  --------------------------------------------------------  IN: 0 mL / OUT: 80 mL / NET: -80 mL      CAPILLARY BLOOD GLUCOSE          PHYSICAL EXAM:  GEN: Male in NAD on RA  HEENT: NC/AT, MMM  CV: RRR, nml S1S2, no murmurs  PULM: nml effort, CTAB  ABD: Soft, non-distended, NABS, non-tender  NEURO  A/O x3, moving all extremities, Lumbar dressing c/d/i. accordion drain in place. Sensation intact  5/5 in b/l plantarflex/ext, hip flex/ext.   PSYCH: Appropriate    MEDICATIONS:  MEDICATIONS  (STANDING):  acetaminophen     Tablet .. 1000 milliGRAM(s) Oral every 8 hours  ALPRAZolam 1 milliGRAM(s) Oral at bedtime  benzocaine/menthol Lozenge 1 Lozenge Oral daily  enoxaparin Injectable 40 milliGRAM(s) SubCutaneous every 24 hours  lactated ringers. 1000 milliLiter(s) (100 mL/Hr) IV Continuous <Continuous>  methocarbamol 500 milliGRAM(s) Oral every 8 hours  pantoprazole    Tablet 40 milliGRAM(s) Oral before breakfast  polyethylene glycol 3350 17 Gram(s) Oral daily  pregabalin 50 milliGRAM(s) Oral three times a day  senna 2 Tablet(s) Oral at bedtime    MEDICATIONS  (PRN):  bisacodyl Suppository 10 milliGRAM(s) Rectal daily PRN Constipation  HYDROmorphone  Injectable 0.5 milliGRAM(s) IV Push every 6 hours PRN Breakthrough pain  magnesium hydroxide Suspension 30 milliLiter(s) Oral every 12 hours PRN Constipation  oxyCODONE    IR 10 milliGRAM(s) Oral every 4 hours PRN Severe Pain (7 - 10)  oxyCODONE    IR 5 milliGRAM(s) Oral every 4 hours PRN Moderate Pain (4 - 6)      ALLERGIES:  Allergies    No Known Allergies    Intolerances        LABS:                        10.9   15.01 )-----------( 177      ( 31 Jul 2024 08:57 )             32.5     07-31    136  |  104  |  21  ----------------------------<  130<H>  3.9   |  25  |  1.16    Ca    8.0<L>      31 Jul 2024 08:57        Urinalysis Basic - ( 31 Jul 2024 08:57 )    Color: x / Appearance: x / SG: x / pH: x  Gluc: 130 mg/dL / Ketone: x  / Bili: x / Urobili: x   Blood: x / Protein: x / Nitrite: x   Leuk Esterase: x / RBC: x / WBC x   Sq Epi: x / Non Sq Epi: x / Bacteria: x        RADIOLOGY & ADDITIONAL TESTS: Reviewed.

## 2024-08-01 LAB
ANION GAP SERPL CALC-SCNC: 10 MMOL/L — SIGNIFICANT CHANGE UP (ref 5–17)
BASOPHILS # BLD AUTO: 0.06 K/UL — SIGNIFICANT CHANGE UP (ref 0–0.2)
BASOPHILS NFR BLD AUTO: 0.4 % — SIGNIFICANT CHANGE UP (ref 0–2)
BUN SERPL-MCNC: 18 MG/DL — SIGNIFICANT CHANGE UP (ref 7–23)
CALCIUM SERPL-MCNC: 8.1 MG/DL — LOW (ref 8.4–10.5)
CHLORIDE SERPL-SCNC: 106 MMOL/L — SIGNIFICANT CHANGE UP (ref 96–108)
CO2 SERPL-SCNC: 24 MMOL/L — SIGNIFICANT CHANGE UP (ref 22–31)
CREAT SERPL-MCNC: 1.15 MG/DL — SIGNIFICANT CHANGE UP (ref 0.5–1.3)
EGFR: 68 ML/MIN/1.73M2 — SIGNIFICANT CHANGE UP
EOSINOPHIL # BLD AUTO: 0.83 K/UL — HIGH (ref 0–0.5)
EOSINOPHIL NFR BLD AUTO: 6.2 % — HIGH (ref 0–6)
GLUCOSE SERPL-MCNC: 112 MG/DL — HIGH (ref 70–99)
HCT VFR BLD CALC: 34.1 % — LOW (ref 39–50)
HGB BLD-MCNC: 11 G/DL — LOW (ref 13–17)
IMM GRANULOCYTES NFR BLD AUTO: 0.5 % — SIGNIFICANT CHANGE UP (ref 0–0.9)
LYMPHOCYTES # BLD AUTO: 15.9 % — SIGNIFICANT CHANGE UP (ref 13–44)
LYMPHOCYTES # BLD AUTO: 2.13 K/UL — SIGNIFICANT CHANGE UP (ref 1–3.3)
MCHC RBC-ENTMCNC: 31.3 PG — SIGNIFICANT CHANGE UP (ref 27–34)
MCHC RBC-ENTMCNC: 32.3 GM/DL — SIGNIFICANT CHANGE UP (ref 32–36)
MCV RBC AUTO: 97.2 FL — SIGNIFICANT CHANGE UP (ref 80–100)
MONOCYTES # BLD AUTO: 1.8 K/UL — HIGH (ref 0–0.9)
MONOCYTES NFR BLD AUTO: 13.4 % — SIGNIFICANT CHANGE UP (ref 2–14)
NEUTROPHILS # BLD AUTO: 8.51 K/UL — HIGH (ref 1.8–7.4)
NEUTROPHILS NFR BLD AUTO: 63.6 % — SIGNIFICANT CHANGE UP (ref 43–77)
NRBC # BLD: 0 /100 WBCS — SIGNIFICANT CHANGE UP (ref 0–0)
PLATELET # BLD AUTO: 186 K/UL — SIGNIFICANT CHANGE UP (ref 150–400)
POTASSIUM SERPL-MCNC: 3.9 MMOL/L — SIGNIFICANT CHANGE UP (ref 3.5–5.3)
POTASSIUM SERPL-SCNC: 3.9 MMOL/L — SIGNIFICANT CHANGE UP (ref 3.5–5.3)
RBC # BLD: 3.51 M/UL — LOW (ref 4.2–5.8)
RBC # FLD: 13.5 % — SIGNIFICANT CHANGE UP (ref 10.3–14.5)
SODIUM SERPL-SCNC: 140 MMOL/L — SIGNIFICANT CHANGE UP (ref 135–145)
WBC # BLD: 13.4 K/UL — HIGH (ref 3.8–10.5)
WBC # FLD AUTO: 13.4 K/UL — HIGH (ref 3.8–10.5)

## 2024-08-01 PROCEDURE — 99232 SBSQ HOSP IP/OBS MODERATE 35: CPT

## 2024-08-01 RX ADMIN — Medication 50 MILLIGRAM(S): at 13:19

## 2024-08-01 RX ADMIN — Medication 17 GRAM(S): at 13:19

## 2024-08-01 RX ADMIN — Medication 1000 MILLIGRAM(S): at 05:42

## 2024-08-01 RX ADMIN — Medication 50 MILLIGRAM(S): at 06:41

## 2024-08-01 RX ADMIN — Medication 50 MILLIGRAM(S): at 21:22

## 2024-08-01 RX ADMIN — Medication 1 MILLIGRAM(S): at 21:22

## 2024-08-01 RX ADMIN — Medication 1000 MILLIGRAM(S): at 22:22

## 2024-08-01 RX ADMIN — Medication 500 MILLIGRAM(S): at 21:22

## 2024-08-01 RX ADMIN — Medication 1000 MILLIGRAM(S): at 13:19

## 2024-08-01 RX ADMIN — Medication 1000 MILLIGRAM(S): at 21:22

## 2024-08-01 RX ADMIN — OXYCODONE HYDROCHLORIDE 10 MILLIGRAM(S): 30 TABLET ORAL at 17:13

## 2024-08-01 RX ADMIN — OXYCODONE HYDROCHLORIDE 10 MILLIGRAM(S): 30 TABLET ORAL at 05:42

## 2024-08-01 RX ADMIN — OXYCODONE HYDROCHLORIDE 10 MILLIGRAM(S): 30 TABLET ORAL at 06:42

## 2024-08-01 RX ADMIN — OXYCODONE HYDROCHLORIDE 10 MILLIGRAM(S): 30 TABLET ORAL at 17:55

## 2024-08-01 RX ADMIN — ENOXAPARIN SODIUM 40 MILLIGRAM(S): 120 INJECTION SUBCUTANEOUS at 13:18

## 2024-08-01 RX ADMIN — Medication 500 MILLIGRAM(S): at 06:41

## 2024-08-01 RX ADMIN — Medication 500 MILLIGRAM(S): at 13:19

## 2024-08-01 RX ADMIN — PANTOPRAZOLE SODIUM 40 MILLIGRAM(S): 20 TABLET, DELAYED RELEASE ORAL at 05:43

## 2024-08-01 NOTE — PROGRESS NOTE ADULT - SUBJECTIVE AND OBJECTIVE BOX
INTERVAL HPI/OVERNIGHT EVENTS: gerard o/n    SUBJECTIVE: Patient seen and examined at bedside.   No BM yet however passing flatus. No N/V/Abd pain. States pain is controlled. Walked wo LH/dizziness, chest pain, dyspnea, fever, dysuria.   OBJECTIVE:    VITAL SIGNS:  ICU Vital Signs Last 24 Hrs  T(C): 36.2 (01 Aug 2024 15:56), Max: 37.9 (01 Aug 2024 08:22)  T(F): 97.1 (01 Aug 2024 15:56), Max: 100.2 (01 Aug 2024 08:22)  HR: 91 (01 Aug 2024 15:56) (87 - 99)  BP: 128/84 (01 Aug 2024 15:56) (114/73 - 128/84)  BP(mean): 87 (31 Jul 2024 20:49) (87 - 87)  ABP: --  ABP(mean): --  RR: 16 (01 Aug 2024 15:56) (16 - 17)  SpO2: 96% (01 Aug 2024 15:56) (92% - 96%)    O2 Parameters below as of 01 Aug 2024 15:56  Patient On (Oxygen Delivery Method): room air              07-31 @ 07:01 - 08-01 @ 07:00  --------------------------------------------------------  IN: 0 mL / OUT: 750 mL / NET: -750 mL    08-01 @ 07:01 - 08-01 @ 17:33  --------------------------------------------------------  IN: 0 mL / OUT: 50 mL / NET: -50 mL      CAPILLARY BLOOD GLUCOSE          PHYSICAL EXAM:  GEN: Male in NAD on RA  HEENT: NC/AT, MMM  CV: RRR, nml S1S2, no murmurs  PULM: nml effort, CTAB  ABD: Soft, non-distended, NABS, non-tender  NEURO  A/O x3, moving all extremities, Lumbar dressing c/d/i. accordion drain in place. Sensation intact  5/5 in b/l plantarflex/ext, hip flex/ext.   PSYCH: Appropriate    MEDICATIONS:  MEDICATIONS  (STANDING):  acetaminophen     Tablet .. 1000 milliGRAM(s) Oral every 8 hours  ALPRAZolam 1 milliGRAM(s) Oral at bedtime  benzocaine/menthol Lozenge 1 Lozenge Oral daily  enoxaparin Injectable 40 milliGRAM(s) SubCutaneous every 24 hours  lactated ringers. 1000 milliLiter(s) (100 mL/Hr) IV Continuous <Continuous>  methocarbamol 500 milliGRAM(s) Oral every 8 hours  pantoprazole    Tablet 40 milliGRAM(s) Oral before breakfast  polyethylene glycol 3350 17 Gram(s) Oral daily  pregabalin 50 milliGRAM(s) Oral three times a day  senna 2 Tablet(s) Oral at bedtime    MEDICATIONS  (PRN):  bisacodyl Suppository 10 milliGRAM(s) Rectal daily PRN Constipation  HYDROmorphone  Injectable 0.5 milliGRAM(s) IV Push every 6 hours PRN Breakthrough pain  magnesium hydroxide Suspension 30 milliLiter(s) Oral every 12 hours PRN Constipation  oxyCODONE    IR 5 milliGRAM(s) Oral every 4 hours PRN Moderate Pain (4 - 6)  oxyCODONE    IR 10 milliGRAM(s) Oral every 4 hours PRN Severe Pain (7 - 10)      ALLERGIES:  Allergies    No Known Allergies    Intolerances        LABS:                        11.0   13.40 )-----------( 186      ( 01 Aug 2024 06:47 )             34.1     08-01    140  |  106  |  18  ----------------------------<  112<H>  3.9   |  24  |  1.15    Ca    8.1<L>      01 Aug 2024 06:47        Urinalysis Basic - ( 01 Aug 2024 06:47 )    Color: x / Appearance: x / SG: x / pH: x  Gluc: 112 mg/dL / Ketone: x  / Bili: x / Urobili: x   Blood: x / Protein: x / Nitrite: x   Leuk Esterase: x / RBC: x / WBC x   Sq Epi: x / Non Sq Epi: x / Bacteria: x        RADIOLOGY & ADDITIONAL TESTS: Reviewed.

## 2024-08-01 NOTE — PROGRESS NOTE ADULT - SUBJECTIVE AND OBJECTIVE BOX
Ortho Note    Pt comfortable without complaints, pain controlled  Denies CP, SOB, N/V, numbness/tingling     Vital Signs Last 24 Hrs  T(C): 36.8 (08-01-24 @ 05:19), Max: 36.8 (08-01-24 @ 05:19)  T(F): 98.3 (08-01-24 @ 05:19), Max: 98.3 (08-01-24 @ 05:19)  HR: 89 (08-01-24 @ 05:19) (89 - 89)  BP: 114/73 (08-01-24 @ 05:19) (114/73 - 114/73)  BP(mean): --  RR: 17 (08-01-24 @ 05:19) (17 - 17)  SpO2: 92% (08-01-24 @ 05:19) (92% - 92%)  I&O's Summary    30 Jul 2024 07:01  -  31 Jul 2024 07:00  --------------------------------------------------------  IN: 0 mL / OUT: 1590 mL / NET: -1590 mL    31 Jul 2024 07:01  -  01 Aug 2024 06:06  --------------------------------------------------------  IN: 0 mL / OUT: 750 mL / NET: -750 mL        General: Pt Alert and oriented, NAD  DSG C/D/I  Pulses: 2+  Sensation: SILT  Motor: 5/5 Quad/Ham/EHL/FHL/TA/GS  HV 80/220                        10.9   15.01 )-----------( 177      ( 31 Jul 2024 08:57 )             32.5     07-31    136  |  104  |  21  ----------------------------<  130<H>  3.9   |  25  |  1.16    Ca    8.0<L>      31 Jul 2024 08:57        A/P: 73yMale POD# 3 s/p L2-pelvis PSF, L3-5 Lami   - Stable  - Pain Control  - DVT ppx: SCDs, lovenox  - PT, WBS: WBAT  - Cleared PT  - Home pending drain removal on Friday/tomorrow    Ortho Pager [phone]

## 2024-08-01 NOTE — PROGRESS NOTE ADULT - SUBJECTIVE AND OBJECTIVE BOX
Ortho Note    Pt comfortable without complaints, pain controlled  Denies CP, SOB, N/V, numbness/tingling     Vital Signs Last 24 Hrs  T(C): 37.9 (08-01-24 @ 08:22), Max: 37.9 (08-01-24 @ 08:22)  T(F): 100.2 (08-01-24 @ 08:22), Max: 100.2 (08-01-24 @ 08:22)  HR: 87 (08-01-24 @ 08:22) (87 - 89)  BP: 120/75 (08-01-24 @ 08:22) (114/73 - 120/75)  BP(mean): --  RR: 16 (08-01-24 @ 08:22) (16 - 17)  SpO2: 92% (08-01-24 @ 08:22) (92% - 92%)  AVSS    General: Pt Alert and oriented, NAD  DSG- gauze/ paper tape C/D/I  Pulses: +2DP, WWP feet  Sensation: SILT BLE  Motor: 5/5 EHL/FHL/TA/GS BLE                          11.0   13.40 )-----------( 186      ( 01 Aug 2024 06:47 )             34.1     08-01    140  |  106  |  18  ----------------------------<  112<H>  3.9   |  24  |  1.15    Ca    8.1<L>      01 Aug 2024 06:47        A/P: 73yMale POD#3 s/p L2-pelvis PSF, L3-5 laminectomy  - VSS, Labs WNL- appreciate internal medicine recommendations  - Pain Control  - DVT ppx: SCDs, LVX 40mg subq daily  - PT, WBS: WBAT, OT consult- cleared PT today  - OOB for meals, I/S  - bowel regimen  - monitor HV x 1 output  - dispo: home pending HV removal    Ortho Pager [phone]

## 2024-08-02 ENCOUNTER — TRANSCRIPTION ENCOUNTER (OUTPATIENT)
Age: 74
End: 2024-08-02

## 2024-08-02 VITALS — WEIGHT: 209 LBS

## 2024-08-02 LAB
ANION GAP SERPL CALC-SCNC: 8 MMOL/L — SIGNIFICANT CHANGE UP (ref 5–17)
BUN SERPL-MCNC: 20 MG/DL — SIGNIFICANT CHANGE UP (ref 7–23)
CALCIUM SERPL-MCNC: 8.6 MG/DL — SIGNIFICANT CHANGE UP (ref 8.4–10.5)
CHLORIDE SERPL-SCNC: 104 MMOL/L — SIGNIFICANT CHANGE UP (ref 96–108)
CO2 SERPL-SCNC: 26 MMOL/L — SIGNIFICANT CHANGE UP (ref 22–31)
CREAT SERPL-MCNC: 1.11 MG/DL — SIGNIFICANT CHANGE UP (ref 0.5–1.3)
EGFR: 71 ML/MIN/1.73M2 — SIGNIFICANT CHANGE UP
GLUCOSE SERPL-MCNC: 118 MG/DL — HIGH (ref 70–99)
HCT VFR BLD CALC: 34.9 % — LOW (ref 39–50)
HGB BLD-MCNC: 11.2 G/DL — LOW (ref 13–17)
MCHC RBC-ENTMCNC: 30.6 PG — SIGNIFICANT CHANGE UP (ref 27–34)
MCHC RBC-ENTMCNC: 32.1 GM/DL — SIGNIFICANT CHANGE UP (ref 32–36)
MCV RBC AUTO: 95.4 FL — SIGNIFICANT CHANGE UP (ref 80–100)
NRBC # BLD: 0 /100 WBCS — SIGNIFICANT CHANGE UP (ref 0–0)
PLATELET # BLD AUTO: 219 K/UL — SIGNIFICANT CHANGE UP (ref 150–400)
POTASSIUM SERPL-MCNC: 4.4 MMOL/L — SIGNIFICANT CHANGE UP (ref 3.5–5.3)
POTASSIUM SERPL-SCNC: 4.4 MMOL/L — SIGNIFICANT CHANGE UP (ref 3.5–5.3)
RBC # BLD: 3.66 M/UL — LOW (ref 4.2–5.8)
RBC # FLD: 13.1 % — SIGNIFICANT CHANGE UP (ref 10.3–14.5)
SODIUM SERPL-SCNC: 138 MMOL/L — SIGNIFICANT CHANGE UP (ref 135–145)
WBC # BLD: 11.97 K/UL — HIGH (ref 3.8–10.5)
WBC # FLD AUTO: 11.97 K/UL — HIGH (ref 3.8–10.5)

## 2024-08-02 PROCEDURE — 86850 RBC ANTIBODY SCREEN: CPT

## 2024-08-02 PROCEDURE — 97116 GAIT TRAINING THERAPY: CPT

## 2024-08-02 PROCEDURE — 86900 BLOOD TYPING SEROLOGIC ABO: CPT

## 2024-08-02 PROCEDURE — 36415 COLL VENOUS BLD VENIPUNCTURE: CPT

## 2024-08-02 PROCEDURE — 99232 SBSQ HOSP IP/OBS MODERATE 35: CPT

## 2024-08-02 PROCEDURE — 97165 OT EVAL LOW COMPLEX 30 MIN: CPT

## 2024-08-02 PROCEDURE — 85027 COMPLETE CBC AUTOMATED: CPT

## 2024-08-02 PROCEDURE — C1769: CPT

## 2024-08-02 PROCEDURE — 76000 FLUOROSCOPY <1 HR PHYS/QHP: CPT

## 2024-08-02 PROCEDURE — 97530 THERAPEUTIC ACTIVITIES: CPT

## 2024-08-02 PROCEDURE — 80048 BASIC METABOLIC PNL TOTAL CA: CPT

## 2024-08-02 PROCEDURE — 86901 BLOOD TYPING SEROLOGIC RH(D): CPT

## 2024-08-02 PROCEDURE — 97161 PT EVAL LOW COMPLEX 20 MIN: CPT

## 2024-08-02 PROCEDURE — 85025 COMPLETE CBC W/AUTO DIFF WBC: CPT

## 2024-08-02 PROCEDURE — C1713: CPT

## 2024-08-02 PROCEDURE — C1889: CPT

## 2024-08-02 RX ADMIN — OXYCODONE HYDROCHLORIDE 10 MILLIGRAM(S): 30 TABLET ORAL at 11:10

## 2024-08-02 RX ADMIN — PANTOPRAZOLE SODIUM 40 MILLIGRAM(S): 20 TABLET, DELAYED RELEASE ORAL at 05:16

## 2024-08-02 RX ADMIN — OXYCODONE HYDROCHLORIDE 10 MILLIGRAM(S): 30 TABLET ORAL at 05:16

## 2024-08-02 RX ADMIN — Medication 50 MILLIGRAM(S): at 05:16

## 2024-08-02 RX ADMIN — Medication 500 MILLIGRAM(S): at 05:16

## 2024-08-02 RX ADMIN — OXYCODONE HYDROCHLORIDE 10 MILLIGRAM(S): 30 TABLET ORAL at 06:16

## 2024-08-02 RX ADMIN — OXYCODONE HYDROCHLORIDE 10 MILLIGRAM(S): 30 TABLET ORAL at 12:10

## 2024-08-02 RX ADMIN — Medication 1000 MILLIGRAM(S): at 05:16

## 2024-08-02 NOTE — PROGRESS NOTE ADULT - SUBJECTIVE AND OBJECTIVE BOX
INTERVAL HPI/OVERNIGHT EVENTS: gerard o/n    SUBJECTIVE: Patient seen and examined at bedside.   Pt reports pain is minimal. Walking wo LH/dizziness, chest pain, dyspnea. No numbness. Eager to return home. Still no BM but passing flatus and tolerate food wo issues. No fever, dysuria.    OBJECTIVE:    VITAL SIGNS:  ICU Vital Signs Last 24 Hrs  T(C): 37.2 (02 Aug 2024 08:37), Max: 37.2 (02 Aug 2024 08:37)  T(F): 98.9 (02 Aug 2024 08:37), Max: 98.9 (02 Aug 2024 08:37)  HR: 84 (02 Aug 2024 08:37) (83 - 94)  BP: 119/83 (02 Aug 2024 08:37) (119/83 - 138/73)  BP(mean): --  ABP: --  ABP(mean): --  RR: 16 (02 Aug 2024 08:37) (16 - 16)  SpO2: 94% (02 Aug 2024 08:37) (94% - 96%)    O2 Parameters below as of 02 Aug 2024 08:37  Patient On (Oxygen Delivery Method): room air              08-01 @ 07:01  -  08-02 @ 07:00  --------------------------------------------------------  IN: 0 mL / OUT: 870 mL / NET: -870 mL      CAPILLARY BLOOD GLUCOSE          PHYSICAL EXAM:  GEN: Male in NAD on RA  HEENT: NC/AT, MMM  CV: RRR, nml S1S2, no murmurs  PULM: nml effort, CTAB  ABD: Soft, non-distended, NABS, non-tender  NEURO  A/O x3, moving all extremities, Lumbar dressing c/d/i. Sensation intact  5/5 in b/l plantarflex/ext, hip flex/ext.   PSYCH: Appropriate    MEDICATIONS:  MEDICATIONS  (STANDING):  acetaminophen     Tablet .. 1000 milliGRAM(s) Oral every 8 hours  ALPRAZolam 1 milliGRAM(s) Oral at bedtime  benzocaine/menthol Lozenge 1 Lozenge Oral daily  enoxaparin Injectable 40 milliGRAM(s) SubCutaneous every 24 hours  lactated ringers. 1000 milliLiter(s) (100 mL/Hr) IV Continuous <Continuous>  methocarbamol 500 milliGRAM(s) Oral every 8 hours  pantoprazole    Tablet 40 milliGRAM(s) Oral before breakfast  polyethylene glycol 3350 17 Gram(s) Oral daily  pregabalin 50 milliGRAM(s) Oral three times a day  senna 2 Tablet(s) Oral at bedtime    MEDICATIONS  (PRN):  bisacodyl Suppository 10 milliGRAM(s) Rectal daily PRN Constipation  HYDROmorphone  Injectable 0.5 milliGRAM(s) IV Push every 6 hours PRN Breakthrough pain  magnesium hydroxide Suspension 30 milliLiter(s) Oral every 12 hours PRN Constipation  oxyCODONE    IR 5 milliGRAM(s) Oral every 4 hours PRN Moderate Pain (4 - 6)  oxyCODONE    IR 10 milliGRAM(s) Oral every 4 hours PRN Severe Pain (7 - 10)      ALLERGIES:  Allergies    No Known Allergies    Intolerances        LABS:                        11.2   11.97 )-----------( 219      ( 02 Aug 2024 07:54 )             34.9     08-02    138  |  104  |  20  ----------------------------<  118<H>  4.4   |  26  |  1.11    Ca    8.6      02 Aug 2024 07:54        Urinalysis Basic - ( 02 Aug 2024 07:54 )    Color: x / Appearance: x / SG: x / pH: x  Gluc: 118 mg/dL / Ketone: x  / Bili: x / Urobili: x   Blood: x / Protein: x / Nitrite: x   Leuk Esterase: x / RBC: x / WBC x   Sq Epi: x / Non Sq Epi: x / Bacteria: x        RADIOLOGY & ADDITIONAL TESTS: Reviewed.

## 2024-08-02 NOTE — PROGRESS NOTE ADULT - ASSESSMENT
73M w CKD3 (Cr 1.49) obesity, p/w chronic low back pain x1y, here for elective L2-Pelvis PSF, L3-5 Lami 7/29 w Dr. Daly    #Post-op state - pain controlled. PPx; SCDs per ortho. On bowel regimen and incentive spirometer  #Lumbar stenosis   - Tylenol 1g q8, Robaxin 500 q8, lyrica 50 q8   - PRN: Oxycodone 5/10 q4 for mod/severe pain    #Leukocytosis - likely reactive - 12 from 13 from 15 from 19.. Pt afebrile and non-toxic appearing  #Acute blood loss anemia - hgb 11 today. From baseline 12.3. Asymptomatic  #CKD3 - 1.1 from 1.4 from baseline 1.49. Caution w NSAIDs  #Obesity - BMI 31.9. Affects all aspects of care    Plan  Doing well overall    DISPO: Home no needs   Above d/w ortho  
73M w CKD3 (Cr 1.49) obesity, p/w chronic low back pain x1y, here for elective L2-Pelvis PSF, L3-5 Lami 7/29 w Dr. Daly    #Post-op state - pain controlled. PPx; SCDs per ortho. On bowel regimen and incentive spirometer  #Lumbar stenosis   - Tylenol 1g q8, Robaxin 500 q8, lyrica 50 q8   - PRN: Oxycodone 5/10 q4 for mod/severe pain    #Leukocytosis - likely reactive - 13 from 15 from 19.. Pt afebrile and non-toxic appearing  #Acute blood loss anemia - hgb 10.9 from 11.8. From baseline 12.3. Asymptomatic  #CKD3 - 1.16 from 1.4 from baseline 1.49. Caution w NSAIDs  #Obesity - BMI 31.9. Affects all aspects of care    Plan  Doing well overall    DISPO: Home no needs pending drain output  Above d/w ortho
73M w CKD3 (Cr 1.49) obesity, p/w chronic low back pain x1y, here for elective L2-Pelvis PSF, L3-5 Lami 7/29 w Dr. Daly    #Post-op state - pain controlled. PPx; SCDs per ortho. On bowel regimen and incentive spirometer  #Lumbar stenosis   - Tylenol 1g q8, Robaxin 500 q8, lyrica 50 q8   - PRN: Oxycodone 5/10 q4 for mod/severe pain    #Leukocytosis - likely reactive - 15 from 19.. Pt afebrile and non-toxic appearing  #Acute blood loss anemia - hgb 10.9 from 11.8. From baseline 12.3. Asymptomatic  #CKD3 - 1.16 from 1.4 from baseline 1.49. Caution w NSAIDs  #Obesity - BMI 31.9. Affects all aspects of care    Plan  Doing well overall    DISPO: Home no needs pending drain output  Above d/w ortho

## 2024-08-02 NOTE — DIETITIAN INITIAL EVALUATION ADULT - PERTINENT LABORATORY DATA
08-02    138  |  104  |  20  ----------------------------<  118<H>  4.4   |  26  |  1.11    Ca    8.6      02 Aug 2024 07:54

## 2024-08-02 NOTE — DIETITIAN INITIAL EVALUATION ADULT - OTHER CALCULATIONS
pounds +-10%; %EFS=849  IBW used to calculate energy needs due to pt's current body weight exceeding 120% of IBW  Adjusted for age and s/p OR

## 2024-08-02 NOTE — DIETITIAN INITIAL EVALUATION ADULT - PERTINENT MEDS FT
MEDICATIONS  (STANDING):  acetaminophen     Tablet .. 1000 milliGRAM(s) Oral every 8 hours  ALPRAZolam 1 milliGRAM(s) Oral at bedtime  benzocaine/menthol Lozenge 1 Lozenge Oral daily  enoxaparin Injectable 40 milliGRAM(s) SubCutaneous every 24 hours  lactated ringers. 1000 milliLiter(s) (100 mL/Hr) IV Continuous <Continuous>  methocarbamol 500 milliGRAM(s) Oral every 8 hours  pantoprazole    Tablet 40 milliGRAM(s) Oral before breakfast  polyethylene glycol 3350 17 Gram(s) Oral daily  pregabalin 50 milliGRAM(s) Oral three times a day  senna 2 Tablet(s) Oral at bedtime    MEDICATIONS  (PRN):  bisacodyl Suppository 10 milliGRAM(s) Rectal daily PRN Constipation  HYDROmorphone  Injectable 0.5 milliGRAM(s) IV Push every 6 hours PRN Breakthrough pain  magnesium hydroxide Suspension 30 milliLiter(s) Oral every 12 hours PRN Constipation  oxyCODONE    IR 5 milliGRAM(s) Oral every 4 hours PRN Moderate Pain (4 - 6)  oxyCODONE    IR 10 milliGRAM(s) Oral every 4 hours PRN Severe Pain (7 - 10)

## 2024-08-02 NOTE — PROGRESS NOTE ADULT - REASON FOR ADMISSION
back pain

## 2024-08-02 NOTE — DIETITIAN INITIAL EVALUATION ADULT - OTHER INFO
73y Male,PMHx: HTN, gastic ulcer; pre-op Cr 1.49 whos is now s/p L2-pelvis PSF, L3-5 lami 7-29. D/c home pending HV removal.    Pt seen this AM. Pending D/c - declined extensive RD visit as result. Diet: Regular. Reports good intake at this time/PTA. Reports pain - medications are ordered at this time. Denies BM as of yet; ordered for Senna MIRALAX Protonix and DULCOLAX. Did not report wt hx; admit wt 209 pounds / BMI 31.9 both seem accurate.   Skin: no edema/pressure ulcers noted at this time, cruz 20.   Please see below for nutritions recommendations.

## 2024-08-02 NOTE — PROGRESS NOTE ADULT - SUBJECTIVE AND OBJECTIVE BOX
Ortho Note    Pt comfortable without complaints, pain controlled  Denies CP, SOB, N/V, numbness/tingling     Vital Signs Last 24 Hrs  T(C): 36.8 (08-02-24 @ 05:19), Max: 36.8 (08-02-24 @ 05:19)  T(F): 98.2 (08-02-24 @ 05:19), Max: 98.2 (08-02-24 @ 05:19)  HR: 83 (08-02-24 @ 05:19) (83 - 83)  BP: 123/79 (08-02-24 @ 05:19) (123/79 - 123/79)  BP(mean): --  RR: 16 (08-02-24 @ 05:19) (16 - 16)  SpO2: 95% (08-02-24 @ 05:19) (95% - 95%)  I&O's Summary    31 Jul 2024 07:01  -  01 Aug 2024 07:00  --------------------------------------------------------  IN: 0 mL / OUT: 750 mL / NET: -750 mL    01 Aug 2024 07:01  -  02 Aug 2024 06:31  --------------------------------------------------------  IN: 0 mL / OUT: 870 mL / NET: -870 mL        General: Pt Alert and oriented, NAD  DSG C/D/I  Pulses: 2+  Sensation: SILT  Motor: 5/5 Quad/Ham/EHL/FHL/TA/GS                          11.0   13.40 )-----------( 186      ( 01 Aug 2024 06:47 )             34.1     08-01    140  |  106  |  18  ----------------------------<  112<H>  3.9   |  24  |  1.15    Ca    8.1<L>      01 Aug 2024 06:47        A/P: 73yMale POD# 4 s/p L2-pelvis PSF, L3-5 lami  - Stable  - Pain Control  - DVT ppx: SCDs, lovenox  - PT, WBS: WBAT  -Cleared PT  - Home pending drain removal today    70/120    Ortho Pager [phone]

## 2024-08-02 NOTE — PROGRESS NOTE ADULT - PROVIDER SPECIALTY LIST ADULT
Orthopedics
Orthopedics
Hospitalist
Orthopedics
Hospitalist
Hospitalist
electronic

## 2024-08-02 NOTE — DISCHARGE NOTE NURSING/CASE MANAGEMENT/SOCIAL WORK - PATIENT PORTAL LINK FT
You can access the FollowMyHealth Patient Portal offered by [facility] by registering at the following website: [email or web address]. By joining [facility]’s FollowMyHealth portal, you will also be able to view your health information using other applications (apps) compatible with our system.

## 2024-08-07 DIAGNOSIS — M43.16 SPONDYLOLISTHESIS, LUMBAR REGION: ICD-10-CM

## 2024-08-07 DIAGNOSIS — M54.16 RADICULOPATHY, LUMBAR REGION: ICD-10-CM

## 2024-08-07 DIAGNOSIS — Z96.652 PRESENCE OF LEFT ARTIFICIAL KNEE JOINT: ICD-10-CM

## 2024-08-07 DIAGNOSIS — K25.9 GASTRIC ULCER, UNSPECIFIED AS ACUTE OR CHRONIC, WITHOUT HEMORRHAGE OR PERFORATION: ICD-10-CM

## 2024-08-07 DIAGNOSIS — I12.9 HYPERTENSIVE CHRONIC KIDNEY DISEASE WITH STAGE 1 THROUGH STAGE 4 CHRONIC KIDNEY DISEASE, OR UNSPECIFIED CHRONIC KIDNEY DISEASE: ICD-10-CM

## 2024-08-07 DIAGNOSIS — D62 ACUTE POSTHEMORRHAGIC ANEMIA: ICD-10-CM

## 2024-08-07 DIAGNOSIS — N18.30 CHRONIC KIDNEY DISEASE, STAGE 3 UNSPECIFIED: ICD-10-CM

## 2024-08-07 DIAGNOSIS — Z96.612 PRESENCE OF LEFT ARTIFICIAL SHOULDER JOINT: ICD-10-CM

## 2024-08-07 DIAGNOSIS — D72.828 OTHER ELEVATED WHITE BLOOD CELL COUNT: ICD-10-CM

## 2024-08-07 DIAGNOSIS — E66.9 OBESITY, UNSPECIFIED: ICD-10-CM

## 2024-08-07 DIAGNOSIS — M41.56 OTHER SECONDARY SCOLIOSIS, LUMBAR REGION: ICD-10-CM

## 2024-08-07 DIAGNOSIS — M53.2X6 SPINAL INSTABILITIES, LUMBAR REGION: ICD-10-CM

## 2024-08-07 DIAGNOSIS — M48.061 SPINAL STENOSIS, LUMBAR REGION WITHOUT NEUROGENIC CLAUDICATION: ICD-10-CM

## 2024-08-13 RX ORDER — METHOCARBAMOL 500 MG/1
500 TABLET, FILM COATED ORAL 3 TIMES DAILY
Qty: 30 | Refills: 0 | Status: ACTIVE | COMMUNITY
Start: 2024-08-13 | End: 1900-01-01

## 2024-08-13 RX ORDER — OXYCODONE 5 MG/1
5 TABLET ORAL
Qty: 30 | Refills: 0 | Status: ACTIVE | COMMUNITY
Start: 2024-08-13 | End: 1900-01-01

## 2024-08-15 ENCOUNTER — APPOINTMENT (OUTPATIENT)
Dept: ORTHOPEDIC SURGERY | Facility: CLINIC | Age: 74
End: 2024-08-15
Payer: MEDICARE

## 2024-08-15 DIAGNOSIS — M54.50 LOW BACK PAIN, UNSPECIFIED: ICD-10-CM

## 2024-08-15 DIAGNOSIS — Z98.1 ARTHRODESIS STATUS: ICD-10-CM

## 2024-08-15 DIAGNOSIS — M54.16 RADICULOPATHY, LUMBAR REGION: ICD-10-CM

## 2024-08-15 DIAGNOSIS — M48.061 SPINAL STENOSIS, LUMBAR REGION WITHOUT NEUROGENIC CLAUDICATION: ICD-10-CM

## 2024-08-15 PROCEDURE — 72100 X-RAY EXAM L-S SPINE 2/3 VWS: CPT

## 2024-08-15 PROCEDURE — 99024 POSTOP FOLLOW-UP VISIT: CPT

## 2024-08-15 NOTE — HISTORY OF PRESENT ILLNESS
[Procedure: ___] : status post [unfilled] [___ Weeks Post Op] : [unfilled] weeks post op [Clean/Dry/Intact] : clean, dry and intact [Xray (Date:___)] : [unfilled] Xray -  [Hardware in Good Position] : hardware in good position [No Obvious Fractures] : no obvious fractures [Slow Progress] : is progressing slowly [No Sign of Infection] : is showing no signs of infection [Adequate Pain Control] : has adequate pain control [Chills] : no chills [Fever] : no fever [Healed] : not healed [Erythema] : not erythematous [Discharge] : absent of discharge [Swelling] : not swollen [Dehiscence] : not dehisced [de-identified] : 74 year M presents for f/u 2.5 weeks s/p L2-pelvis PSF, L3-L5 laminectomy. He reports 10/10 LBP. He has fallen 3x since surgery -- once because he took a few steps backward and another time because he slipped off his mattress which had a plastic covering. He takes oxycodone 5 mg prn and prn robaxin. He has not been taking tylenol. He primarily ambulates with a walker but has started to taper off of it the last 2-3 days while at home.   Denies leg pain,  numbness, tingling, or weakness, fever or chills [de-identified] : Ambulating with walker. [de-identified] : - continue prn oxycodone and robaxin - start tylenol 1000 mg TID - continue to use walker as needed and taper slowly to cane then without aids as tolerated beginning with at home - continue with activity modification including no bending, lifting or twisting - f/u in 4 weeks Call the office with any questions, concerns, or worsening symptoms. Plan was discussed with patient whom expressed understanding and agreed with plan.

## 2024-08-15 NOTE — HISTORY OF PRESENT ILLNESS
[Procedure: ___] : status post [unfilled] [___ Weeks Post Op] : [unfilled] weeks post op [Clean/Dry/Intact] : clean, dry and intact [Xray (Date:___)] : [unfilled] Xray -  [Hardware in Good Position] : hardware in good position [No Obvious Fractures] : no obvious fractures [Slow Progress] : is progressing slowly [No Sign of Infection] : is showing no signs of infection [Adequate Pain Control] : has adequate pain control [Chills] : no chills [Fever] : no fever [Healed] : not healed [Erythema] : not erythematous [Discharge] : absent of discharge [Swelling] : not swollen [Dehiscence] : not dehisced [de-identified] : 74 year M presents for f/u 2.5 weeks s/p L2-pelvis PSF, L3-L5 laminectomy. He reports 10/10 LBP. He has fallen 3x since surgery -- once because he took a few steps backward and another time because he slipped off his mattress which had a plastic covering. He takes oxycodone 5 mg prn and prn robaxin. He has not been taking tylenol. He primarily ambulates with a walker but has started to taper off of it the last 2-3 days while at home.   Denies leg pain,  numbness, tingling, or weakness, fever or chills [de-identified] : Ambulating with walker. [de-identified] : - continue prn oxycodone and robaxin - start tylenol 1000 mg TID - continue to use walker as needed and taper slowly to cane then without aids as tolerated beginning with at home - continue with activity modification including no bending, lifting or twisting - f/u in 4 weeks Call the office with any questions, concerns, or worsening symptoms. Plan was discussed with patient whom expressed understanding and agreed with plan.

## 2024-09-04 RX ORDER — OXYCODONE 5 MG/1
5 TABLET ORAL
Qty: 60 | Refills: 0 | Status: ACTIVE | COMMUNITY
Start: 2024-08-26 | End: 1900-01-01

## 2024-09-12 ENCOUNTER — APPOINTMENT (OUTPATIENT)
Dept: ORTHOPEDIC SURGERY | Facility: CLINIC | Age: 74
End: 2024-09-12
Payer: MEDICARE

## 2024-09-12 DIAGNOSIS — M54.50 LOW BACK PAIN, UNSPECIFIED: ICD-10-CM

## 2024-09-12 DIAGNOSIS — Z98.1 ARTHRODESIS STATUS: ICD-10-CM

## 2024-09-12 PROCEDURE — 99024 POSTOP FOLLOW-UP VISIT: CPT

## 2024-09-12 PROCEDURE — 72100 X-RAY EXAM L-S SPINE 2/3 VWS: CPT

## 2024-09-13 NOTE — HISTORY OF PRESENT ILLNESS
[de-identified] : Sebastian Salinas is improving, albeit slowly. He still needs the walker. The most difficult thing for him is getting out of a chair. He usually requires Sebastian Gill to pull him up by one shoulder. His x-rays appear unchanged, though there is a question of pedicle fracture, chance-like fracture at L2 at his proximal most level.    [de-identified] : I recommended that he wear a lumbosacral brace, continue with the bone stimulator, use the walker, and follow up in six to eight weeks time. He's neurologically intact. There's no screw lucencies.

## 2024-09-13 NOTE — HISTORY OF PRESENT ILLNESS
[de-identified] : Sebastian Salinas is improving, albeit slowly. He still needs the walker. The most difficult thing for him is getting out of a chair. He usually requires Sebastian Gill to pull him up by one shoulder. His x-rays appear unchanged, though there is a question of pedicle fracture, chance-like fracture at L2 at his proximal most level.    [de-identified] : I recommended that he wear a lumbosacral brace, continue with the bone stimulator, use the walker, and follow up in six to eight weeks time. He's neurologically intact. There's no screw lucencies.

## 2024-09-13 NOTE — HISTORY OF PRESENT ILLNESS
[de-identified] : Sebastian Salinas is improving, albeit slowly. He still needs the walker. The most difficult thing for him is getting out of a chair. He usually requires Sebastian Gill to pull him up by one shoulder. His x-rays appear unchanged, though there is a question of pedicle fracture, chance-like fracture at L2 at his proximal most level.    [de-identified] : I recommended that he wear a lumbosacral brace, continue with the bone stimulator, use the walker, and follow up in six to eight weeks time. He's neurologically intact. There's no screw lucencies.

## 2024-09-13 NOTE — END OF VISIT
[FreeTextEntry3] : Documented by Uma Matamoros acting as a scribe for Gino Daly on 09/12/2024   All medical record entries made by the Scribe were at my, Dr. Gino Daly direction and personally dictated by me on 09/12/2024 . I have reviewed the chart and agree that the record accurately reflects my personal performance of the history, physical exam, assessment and plan. I have also personally directed, reviewed, and agreed with the chart.
WFL

## 2024-09-13 NOTE — ADDENDUM
[FreeTextEntry1] : I, Uma Matamoros (scribe) assisted in filling out this chart under the dictation of Gino Daly on 09/12/2024

## 2024-09-25 RX ORDER — GABAPENTIN 300 MG/1
300 CAPSULE ORAL 3 TIMES DAILY
Qty: 30 | Refills: 0 | Status: ACTIVE | COMMUNITY
Start: 2024-09-25 | End: 1900-01-01

## 2024-09-26 RX ORDER — OXYCODONE 10 MG/1
10 TABLET ORAL
Qty: 60 | Refills: 0 | Status: ACTIVE | COMMUNITY
Start: 2024-09-26 | End: 1900-01-01

## 2024-10-10 ENCOUNTER — NON-APPOINTMENT (OUTPATIENT)
Age: 74
End: 2024-10-10

## 2024-10-10 ENCOUNTER — APPOINTMENT (OUTPATIENT)
Dept: ORTHOPEDIC SURGERY | Facility: CLINIC | Age: 74
End: 2024-10-10
Payer: MEDICARE

## 2024-10-10 DIAGNOSIS — M54.50 LOW BACK PAIN, UNSPECIFIED: ICD-10-CM

## 2024-10-10 DIAGNOSIS — Z98.1 ARTHRODESIS STATUS: ICD-10-CM

## 2024-10-10 PROCEDURE — 99024 POSTOP FOLLOW-UP VISIT: CPT

## 2024-10-10 PROCEDURE — 72100 X-RAY EXAM L-S SPINE 2/3 VWS: CPT

## 2024-11-26 RX ORDER — OXYCODONE AND ACETAMINOPHEN 10; 325 MG/1; MG/1
10-325 TABLET ORAL
Qty: 100 | Refills: 0 | Status: ACTIVE | COMMUNITY
Start: 2024-11-26 | End: 1900-01-01

## 2024-12-12 ENCOUNTER — APPOINTMENT (OUTPATIENT)
Dept: ORTHOPEDIC SURGERY | Facility: CLINIC | Age: 74
End: 2024-12-12
Payer: MEDICARE

## 2024-12-12 VITALS
DIASTOLIC BLOOD PRESSURE: 90 MMHG | OXYGEN SATURATION: 95 % | HEART RATE: 90 BPM | SYSTOLIC BLOOD PRESSURE: 166 MMHG | HEIGHT: 68 IN | BODY MASS INDEX: 30.62 KG/M2 | WEIGHT: 202 LBS

## 2024-12-12 DIAGNOSIS — M54.50 LOW BACK PAIN, UNSPECIFIED: ICD-10-CM

## 2024-12-12 DIAGNOSIS — Z98.1 ARTHRODESIS STATUS: ICD-10-CM

## 2024-12-12 PROCEDURE — 72100 X-RAY EXAM L-S SPINE 2/3 VWS: CPT

## 2024-12-12 PROCEDURE — 99212 OFFICE O/P EST SF 10 MIN: CPT

## 2024-12-31 ENCOUNTER — NON-APPOINTMENT (OUTPATIENT)
Age: 74
End: 2024-12-31

## 2025-01-21 RX ORDER — OXYCODONE AND ACETAMINOPHEN 10; 325 MG/1; MG/1
10-325 TABLET ORAL
Qty: 30 | Refills: 0 | Status: ACTIVE | COMMUNITY
Start: 2025-01-21 | End: 1900-01-01

## 2025-01-21 RX ORDER — GABAPENTIN 300 MG/1
300 CAPSULE ORAL 3 TIMES DAILY
Qty: 90 | Refills: 1 | Status: ACTIVE | COMMUNITY
Start: 2025-01-21 | End: 1900-01-01

## 2025-01-21 RX ORDER — METHOCARBAMOL 500 MG/1
500 TABLET, FILM COATED ORAL EVERY 8 HOURS
Qty: 42 | Refills: 1 | Status: ACTIVE | COMMUNITY
Start: 2025-01-21 | End: 1900-01-01

## 2025-02-20 ENCOUNTER — APPOINTMENT (OUTPATIENT)
Dept: ORTHOPEDIC SURGERY | Facility: CLINIC | Age: 75
End: 2025-02-20
Payer: MEDICARE

## 2025-02-20 ENCOUNTER — NON-APPOINTMENT (OUTPATIENT)
Age: 75
End: 2025-02-20

## 2025-02-20 VITALS
DIASTOLIC BLOOD PRESSURE: 88 MMHG | WEIGHT: 202 LBS | SYSTOLIC BLOOD PRESSURE: 147 MMHG | OXYGEN SATURATION: 98 % | BODY MASS INDEX: 30.62 KG/M2 | HEART RATE: 97 BPM | HEIGHT: 68 IN

## 2025-02-20 DIAGNOSIS — R29.898 OTHER SYMPTOMS AND SIGNS INVOLVING THE MUSCULOSKELETAL SYSTEM: ICD-10-CM

## 2025-02-20 DIAGNOSIS — Z98.1 ARTHRODESIS STATUS: ICD-10-CM

## 2025-02-20 DIAGNOSIS — M54.50 LOW BACK PAIN, UNSPECIFIED: ICD-10-CM

## 2025-02-20 PROCEDURE — 99212 OFFICE O/P EST SF 10 MIN: CPT

## 2025-02-20 PROCEDURE — 72100 X-RAY EXAM L-S SPINE 2/3 VWS: CPT

## 2025-04-24 ENCOUNTER — APPOINTMENT (OUTPATIENT)
Dept: ORTHOPEDIC SURGERY | Facility: CLINIC | Age: 75
End: 2025-04-24
Payer: MEDICARE

## 2025-04-24 DIAGNOSIS — Z98.1 ARTHRODESIS STATUS: ICD-10-CM

## 2025-04-24 DIAGNOSIS — M54.50 LOW BACK PAIN, UNSPECIFIED: ICD-10-CM

## 2025-04-24 PROCEDURE — 99212 OFFICE O/P EST SF 10 MIN: CPT

## 2025-06-26 ENCOUNTER — APPOINTMENT (OUTPATIENT)
Dept: ORTHOPEDIC SURGERY | Facility: CLINIC | Age: 75
End: 2025-06-26
Payer: MEDICARE

## 2025-06-26 VITALS
SYSTOLIC BLOOD PRESSURE: 138 MMHG | BODY MASS INDEX: 30.62 KG/M2 | OXYGEN SATURATION: 96 % | HEART RATE: 97 BPM | TEMPERATURE: 98.8 F | HEIGHT: 68 IN | WEIGHT: 202 LBS | DIASTOLIC BLOOD PRESSURE: 78 MMHG

## 2025-06-26 PROCEDURE — 72082 X-RAY EXAM ENTIRE SPI 2/3 VW: CPT

## 2025-06-26 PROCEDURE — 99212 OFFICE O/P EST SF 10 MIN: CPT

## 2025-06-27 RX ORDER — CYCLOBENZAPRINE HYDROCHLORIDE 5 MG/1
5 TABLET, FILM COATED ORAL 3 TIMES DAILY
Qty: 270 | Refills: 0 | Status: ACTIVE | COMMUNITY
Start: 2025-06-27 | End: 1900-01-01

## (undated) DEVICE — SUT VICRYL 2-0 27" SH UNDYED

## (undated) DEVICE — LAVAGE SIMPLUSE

## (undated) DEVICE — DRAPE 1/2 SHEET 40X57"

## (undated) DEVICE — MIDAS REX MR8 MATCH HEAD FLUTED LG BORE 3MM X 14CM

## (undated) DEVICE — PACK SPINE

## (undated) DEVICE — DRAPE GENERAL ENDOSCOPY

## (undated) DEVICE — SUT VICRYL 2-0 27" CT-1 UNDYED

## (undated) DEVICE — VENODYNE/SCD SLEEVE CALF MEDIUM

## (undated) DEVICE — DRAPE BACK TABLE COVER 80X90"

## (undated) DEVICE — DRAPE INSTRUMENT POUCH 6.75" X 11"

## (undated) DEVICE — GLV 9 PROTEXIS ORTHO (BROWN)

## (undated) DEVICE — STRYKER BONE MILL BLADE MEDIUM 5.0MM

## (undated) DEVICE — PREP DURAPREP 26CC

## (undated) DEVICE — SUT STRATAFIX SYMMETRIC PDS 1 45CM OS-6

## (undated) DEVICE — NDL SPINAL 18G X 3.5" (PINK)

## (undated) DEVICE — DRAIN JACKSON PRATT 3 SPRING RESERVOIR W 10FR PVC DRAIN

## (undated) DEVICE — ELCTR STRYKER NEPTUNE SMOKE EVACUATION PENCIL (GREEN)

## (undated) DEVICE — WARMING BLANKET UPPER ADULT

## (undated) DEVICE — POSITIONER JACKSON TABLE XODUS

## (undated) DEVICE — MIDAS REX MR8 BALL FLUTED LG BORE 5MM X 14CM

## (undated) DEVICE — DRAPE 3/4 SHEET 52X76"

## (undated) DEVICE — SUT VICRYL 1 36" CTB-1 UNDYED

## (undated) DEVICE — STAPLER SKIN PROXIMATE